# Patient Record
Sex: MALE | Race: WHITE | NOT HISPANIC OR LATINO
[De-identification: names, ages, dates, MRNs, and addresses within clinical notes are randomized per-mention and may not be internally consistent; named-entity substitution may affect disease eponyms.]

---

## 2017-05-03 ENCOUNTER — APPOINTMENT (OUTPATIENT)
Dept: DERMATOLOGY | Facility: CLINIC | Age: 57
End: 2017-05-03

## 2017-06-05 ENCOUNTER — APPOINTMENT (OUTPATIENT)
Dept: DERMATOLOGY | Facility: CLINIC | Age: 57
End: 2017-06-05

## 2018-04-24 ENCOUNTER — EMERGENCY (EMERGENCY)
Facility: HOSPITAL | Age: 58
LOS: 1 days | Discharge: HOME | End: 2018-04-24
Attending: STUDENT IN AN ORGANIZED HEALTH CARE EDUCATION/TRAINING PROGRAM
Payer: COMMERCIAL

## 2018-04-24 VITALS
RESPIRATION RATE: 20 BRPM | HEIGHT: 71 IN | DIASTOLIC BLOOD PRESSURE: 94 MMHG | WEIGHT: 229.94 LBS | SYSTOLIC BLOOD PRESSURE: 168 MMHG | TEMPERATURE: 98 F | OXYGEN SATURATION: 98 % | HEART RATE: 100 BPM

## 2018-04-24 LAB
ALBUMIN SERPL ELPH-MCNC: 4.6 G/DL — SIGNIFICANT CHANGE UP (ref 3.3–5.2)
ALP SERPL-CCNC: 75 U/L — SIGNIFICANT CHANGE UP (ref 40–120)
ALT FLD-CCNC: 38 U/L — SIGNIFICANT CHANGE UP
ANION GAP SERPL CALC-SCNC: 13 MMOL/L — SIGNIFICANT CHANGE UP (ref 5–17)
AST SERPL-CCNC: 56 U/L — HIGH
BASOPHILS # BLD AUTO: 0 K/UL — SIGNIFICANT CHANGE UP (ref 0–0.2)
BASOPHILS NFR BLD AUTO: 0.5 % — SIGNIFICANT CHANGE UP (ref 0–2)
BILIRUB SERPL-MCNC: 0.4 MG/DL — SIGNIFICANT CHANGE UP (ref 0.4–2)
BUN SERPL-MCNC: 8 MG/DL — SIGNIFICANT CHANGE UP (ref 8–20)
CALCIUM SERPL-MCNC: 8.8 MG/DL — SIGNIFICANT CHANGE UP (ref 8.6–10.2)
CHLORIDE SERPL-SCNC: 94 MMOL/L — LOW (ref 98–107)
CO2 SERPL-SCNC: 29 MMOL/L — SIGNIFICANT CHANGE UP (ref 22–29)
CREAT SERPL-MCNC: 0.66 MG/DL — SIGNIFICANT CHANGE UP (ref 0.5–1.3)
EOSINOPHIL # BLD AUTO: 0.3 K/UL — SIGNIFICANT CHANGE UP (ref 0–0.5)
EOSINOPHIL NFR BLD AUTO: 4.4 % — SIGNIFICANT CHANGE UP (ref 0–5)
ETHANOL SERPL-MCNC: 266 MG/DL — SIGNIFICANT CHANGE UP
GLUCOSE SERPL-MCNC: 124 MG/DL — HIGH (ref 70–115)
HCT VFR BLD CALC: 44.9 % — SIGNIFICANT CHANGE UP (ref 42–52)
HGB BLD-MCNC: 15.1 G/DL — SIGNIFICANT CHANGE UP (ref 14–18)
LYMPHOCYTES # BLD AUTO: 1.4 K/UL — SIGNIFICANT CHANGE UP (ref 1–4.8)
LYMPHOCYTES # BLD AUTO: 22.4 % — SIGNIFICANT CHANGE UP (ref 20–55)
MCHC RBC-ENTMCNC: 31.4 PG — HIGH (ref 27–31)
MCHC RBC-ENTMCNC: 33.6 G/DL — SIGNIFICANT CHANGE UP (ref 32–36)
MCV RBC AUTO: 93.3 FL — SIGNIFICANT CHANGE UP (ref 80–94)
MONOCYTES # BLD AUTO: 0.9 K/UL — HIGH (ref 0–0.8)
MONOCYTES NFR BLD AUTO: 14 % — HIGH (ref 3–10)
NEUTROPHILS # BLD AUTO: 3.7 K/UL — SIGNIFICANT CHANGE UP (ref 1.8–8)
NEUTROPHILS NFR BLD AUTO: 58.2 % — SIGNIFICANT CHANGE UP (ref 37–73)
PLATELET # BLD AUTO: 235 K/UL — SIGNIFICANT CHANGE UP (ref 150–400)
POTASSIUM SERPL-MCNC: 3.9 MMOL/L — SIGNIFICANT CHANGE UP (ref 3.5–5.3)
POTASSIUM SERPL-SCNC: 3.9 MMOL/L — SIGNIFICANT CHANGE UP (ref 3.5–5.3)
PROT SERPL-MCNC: 7.7 G/DL — SIGNIFICANT CHANGE UP (ref 6.6–8.7)
RBC # BLD: 4.81 M/UL — SIGNIFICANT CHANGE UP (ref 4.6–6.2)
RBC # FLD: 13.7 % — SIGNIFICANT CHANGE UP (ref 11–15.6)
SODIUM SERPL-SCNC: 136 MMOL/L — SIGNIFICANT CHANGE UP (ref 135–145)
WBC # BLD: 6.4 K/UL — SIGNIFICANT CHANGE UP (ref 4.8–10.8)
WBC # FLD AUTO: 6.4 K/UL — SIGNIFICANT CHANGE UP (ref 4.8–10.8)

## 2018-04-24 PROCEDURE — 72125 CT NECK SPINE W/O DYE: CPT | Mod: 26

## 2018-04-24 PROCEDURE — 70450 CT HEAD/BRAIN W/O DYE: CPT | Mod: 26

## 2018-04-24 PROCEDURE — 70486 CT MAXILLOFACIAL W/O DYE: CPT | Mod: 26

## 2018-04-24 PROCEDURE — 12013 RPR F/E/E/N/L/M 2.6-5.0 CM: CPT

## 2018-04-24 PROCEDURE — 99284 EMERGENCY DEPT VISIT MOD MDM: CPT | Mod: 25

## 2018-04-24 RX ORDER — TETANUS TOXOID, REDUCED DIPHTHERIA TOXOID AND ACELLULAR PERTUSSIS VACCINE, ADSORBED 5; 2.5; 8; 8; 2.5 [IU]/.5ML; [IU]/.5ML; UG/.5ML; UG/.5ML; UG/.5ML
0.5 SUSPENSION INTRAMUSCULAR ONCE
Qty: 0 | Refills: 0 | Status: COMPLETED | OUTPATIENT
Start: 2018-04-24 | End: 2018-04-24

## 2018-04-24 RX ADMIN — TETANUS TOXOID, REDUCED DIPHTHERIA TOXOID AND ACELLULAR PERTUSSIS VACCINE, ADSORBED 0.5 MILLILITER(S): 5; 2.5; 8; 8; 2.5 SUSPENSION INTRAMUSCULAR at 23:16
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
## 2018-04-24 NOTE — ED PROVIDER NOTE - MEDICAL DECISION MAKING DETAILS
Will evaluate for significant traumatic injury and possibly needs plastics to manage facial laceration.
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
## 2018-04-25 VITALS
OXYGEN SATURATION: 98 % | DIASTOLIC BLOOD PRESSURE: 81 MMHG | HEART RATE: 89 BPM | RESPIRATION RATE: 18 BRPM | TEMPERATURE: 98 F | SYSTOLIC BLOOD PRESSURE: 132 MMHG

## 2018-04-25 LAB — ETHANOL SERPL-MCNC: 102 MG/DL — SIGNIFICANT CHANGE UP

## 2018-04-25 PROCEDURE — 90715 TDAP VACCINE 7 YRS/> IM: CPT

## 2018-04-25 PROCEDURE — 70450 CT HEAD/BRAIN W/O DYE: CPT

## 2018-04-25 PROCEDURE — 90471 IMMUNIZATION ADMIN: CPT

## 2018-04-25 PROCEDURE — 70486 CT MAXILLOFACIAL W/O DYE: CPT

## 2018-04-25 PROCEDURE — 85027 COMPLETE CBC AUTOMATED: CPT

## 2018-04-25 PROCEDURE — 80053 COMPREHEN METABOLIC PANEL: CPT

## 2018-04-25 PROCEDURE — 12013 RPR F/E/E/N/L/M 2.6-5.0 CM: CPT

## 2018-04-25 PROCEDURE — 72125 CT NECK SPINE W/O DYE: CPT

## 2018-04-25 PROCEDURE — 99285 EMERGENCY DEPT VISIT HI MDM: CPT | Mod: 25

## 2018-04-25 PROCEDURE — 96374 THER/PROPH/DIAG INJ IV PUSH: CPT | Mod: XU

## 2018-04-25 PROCEDURE — 80307 DRUG TEST PRSMV CHEM ANLYZR: CPT

## 2018-04-25 PROCEDURE — 36415 COLL VENOUS BLD VENIPUNCTURE: CPT

## 2018-04-25 RX ORDER — IBUPROFEN 200 MG
600 TABLET ORAL ONCE
Qty: 0 | Refills: 0 | Status: COMPLETED | OUTPATIENT
Start: 2018-04-25 | End: 2018-04-25

## 2018-04-25 RX ORDER — IBUPROFEN 200 MG
1 TABLET ORAL
Qty: 28 | Refills: 0
Start: 2018-04-25 | End: 2018-05-01

## 2018-04-25 RX ORDER — AMPICILLIN SODIUM AND SULBACTAM SODIUM 250; 125 MG/ML; MG/ML
3 INJECTION, POWDER, FOR SUSPENSION INTRAMUSCULAR; INTRAVENOUS ONCE
Qty: 0 | Refills: 0 | Status: COMPLETED | OUTPATIENT
Start: 2018-04-25 | End: 2018-04-25

## 2018-04-25 RX ADMIN — Medication 600 MILLIGRAM(S): at 05:21

## 2018-04-25 RX ADMIN — AMPICILLIN SODIUM AND SULBACTAM SODIUM 200 GRAM(S): 250; 125 INJECTION, POWDER, FOR SUSPENSION INTRAMUSCULAR; INTRAVENOUS at 04:18

## 2018-04-25 NOTE — ED ADULT NURSE REASSESSMENT NOTE - NS ED NURSE REASSESS COMMENT FT1
pt c/o headache, medicated as ordered, resp even and unlabored no distress noted, pt A&Ox4 laying in stretcher and appears comfortable pt has no other complaints, will continue to monitor

## 2018-04-25 NOTE — ED ADULT NURSE REASSESSMENT NOTE - NS ED NURSE REASSESS COMMENT FT1
Patient verbalized understanding of discharge instructions, need for followup with (PMD and/or specialist)without fail.  Patient also provided with signs and symptoms to return to the emergency department immediately if they present.  Patient A+Ox3, resps even and nonlabored, patient in no apparent distress upon discharge.

## 2020-02-28 NOTE — ED ADULT NURSE NOTE - CAS TRG GEN SKIN CONDITION
----- Message from CAN Sexton sent at 2/28/2020  8:19 AM CST -----  Please notify the patient of normal results.  Calcium score is zero which is very reassuring and confers low near term risk of serious cardiac event related to atherosclerosis. Recommend ongoing lifestyle modifications for cholesterol control at this time. Repeat labs in one year prior to CPE. Follow-up as planned.  
Informed of results and recommendations.    Jayme verbalized thanks  
Left message.  
Patient returns nurse call, unable to warm transfer.  Please call patient back at [phone]   
Warm

## 2021-06-10 ENCOUNTER — EMERGENCY (EMERGENCY)
Facility: HOSPITAL | Age: 61
LOS: 1 days | Discharge: HOME | End: 2021-06-10
Attending: STUDENT IN AN ORGANIZED HEALTH CARE EDUCATION/TRAINING PROGRAM
Payer: COMMERCIAL

## 2021-06-10 VITALS
RESPIRATION RATE: 18 BRPM | HEART RATE: 69 BPM | TEMPERATURE: 98 F | HEIGHT: 71 IN | DIASTOLIC BLOOD PRESSURE: 85 MMHG | OXYGEN SATURATION: 96 % | SYSTOLIC BLOOD PRESSURE: 132 MMHG

## 2021-06-10 LAB
ALBUMIN SERPL ELPH-MCNC: 4.3 G/DL — SIGNIFICANT CHANGE UP (ref 3.3–5.2)
ALP SERPL-CCNC: 94 U/L — SIGNIFICANT CHANGE UP (ref 40–120)
ALT FLD-CCNC: 148 U/L — HIGH
ANION GAP SERPL CALC-SCNC: 14 MMOL/L — SIGNIFICANT CHANGE UP (ref 5–17)
APTT BLD: 31.4 SEC — SIGNIFICANT CHANGE UP (ref 27.5–35.5)
AST SERPL-CCNC: 223 U/L — HIGH
BASOPHILS # BLD AUTO: 0.05 K/UL — SIGNIFICANT CHANGE UP (ref 0–0.2)
BASOPHILS NFR BLD AUTO: 0.9 % — SIGNIFICANT CHANGE UP (ref 0–2)
BILIRUB SERPL-MCNC: 0.6 MG/DL — SIGNIFICANT CHANGE UP (ref 0.4–2)
BLD GP AB SCN SERPL QL: SIGNIFICANT CHANGE UP
BUN SERPL-MCNC: 4.1 MG/DL — LOW (ref 8–20)
CALCIUM SERPL-MCNC: 9 MG/DL — SIGNIFICANT CHANGE UP (ref 8.6–10.2)
CHLORIDE SERPL-SCNC: 101 MMOL/L — SIGNIFICANT CHANGE UP (ref 98–107)
CO2 SERPL-SCNC: 26 MMOL/L — SIGNIFICANT CHANGE UP (ref 22–29)
CREAT SERPL-MCNC: 0.64 MG/DL — SIGNIFICANT CHANGE UP (ref 0.5–1.3)
EOSINOPHIL # BLD AUTO: 0.06 K/UL — SIGNIFICANT CHANGE UP (ref 0–0.5)
EOSINOPHIL NFR BLD AUTO: 1.1 % — SIGNIFICANT CHANGE UP (ref 0–6)
ETHANOL SERPL-MCNC: 376 MG/DL — HIGH (ref 0–9)
GLUCOSE SERPL-MCNC: 115 MG/DL — HIGH (ref 70–99)
HCT VFR BLD CALC: 44.8 % — SIGNIFICANT CHANGE UP (ref 39–50)
HGB BLD-MCNC: 14.8 G/DL — SIGNIFICANT CHANGE UP (ref 13–17)
IMM GRANULOCYTES NFR BLD AUTO: 0.2 % — SIGNIFICANT CHANGE UP (ref 0–1.5)
INR BLD: 1.02 RATIO — SIGNIFICANT CHANGE UP (ref 0.88–1.16)
LACTATE BLDV-MCNC: 2.5 MMOL/L — HIGH (ref 0.5–2)
LIDOCAIN IGE QN: 34 U/L — SIGNIFICANT CHANGE UP (ref 22–51)
LYMPHOCYTES # BLD AUTO: 2.38 K/UL — SIGNIFICANT CHANGE UP (ref 1–3.3)
LYMPHOCYTES # BLD AUTO: 43.6 % — SIGNIFICANT CHANGE UP (ref 13–44)
MCHC RBC-ENTMCNC: 31.8 PG — SIGNIFICANT CHANGE UP (ref 27–34)
MCHC RBC-ENTMCNC: 33 GM/DL — SIGNIFICANT CHANGE UP (ref 32–36)
MCV RBC AUTO: 96.3 FL — SIGNIFICANT CHANGE UP (ref 80–100)
MONOCYTES # BLD AUTO: 0.45 K/UL — SIGNIFICANT CHANGE UP (ref 0–0.9)
MONOCYTES NFR BLD AUTO: 8.2 % — SIGNIFICANT CHANGE UP (ref 2–14)
NEUTROPHILS # BLD AUTO: 2.51 K/UL — SIGNIFICANT CHANGE UP (ref 1.8–7.4)
NEUTROPHILS NFR BLD AUTO: 46 % — SIGNIFICANT CHANGE UP (ref 43–77)
PLATELET # BLD AUTO: 201 K/UL — SIGNIFICANT CHANGE UP (ref 150–400)
POTASSIUM SERPL-MCNC: 4 MMOL/L — SIGNIFICANT CHANGE UP (ref 3.5–5.3)
POTASSIUM SERPL-SCNC: 4 MMOL/L — SIGNIFICANT CHANGE UP (ref 3.5–5.3)
PROT SERPL-MCNC: 7.7 G/DL — SIGNIFICANT CHANGE UP (ref 6.6–8.7)
PROTHROM AB SERPL-ACNC: 11.8 SEC — SIGNIFICANT CHANGE UP (ref 10.6–13.6)
RBC # BLD: 4.65 M/UL — SIGNIFICANT CHANGE UP (ref 4.2–5.8)
RBC # FLD: 13.7 % — SIGNIFICANT CHANGE UP (ref 10.3–14.5)
SODIUM SERPL-SCNC: 141 MMOL/L — SIGNIFICANT CHANGE UP (ref 135–145)
WBC # BLD: 5.46 K/UL — SIGNIFICANT CHANGE UP (ref 3.8–10.5)
WBC # FLD AUTO: 5.46 K/UL — SIGNIFICANT CHANGE UP (ref 3.8–10.5)

## 2021-06-10 PROCEDURE — 99285 EMERGENCY DEPT VISIT HI MDM: CPT

## 2021-06-10 PROCEDURE — 70450 CT HEAD/BRAIN W/O DYE: CPT | Mod: 26,MA

## 2021-06-10 PROCEDURE — 70486 CT MAXILLOFACIAL W/O DYE: CPT | Mod: 26,MA

## 2021-06-10 PROCEDURE — 74177 CT ABD & PELVIS W/CONTRAST: CPT | Mod: 26,MA

## 2021-06-10 PROCEDURE — 71045 X-RAY EXAM CHEST 1 VIEW: CPT | Mod: 26

## 2021-06-10 PROCEDURE — 71260 CT THORAX DX C+: CPT | Mod: 26,MA

## 2021-06-10 PROCEDURE — 72125 CT NECK SPINE W/O DYE: CPT | Mod: 26,MA

## 2021-06-10 PROCEDURE — 99281 EMR DPT VST MAYX REQ PHY/QHP: CPT | Mod: GC

## 2021-06-10 RX ORDER — SODIUM CHLORIDE 9 MG/ML
1000 INJECTION INTRAMUSCULAR; INTRAVENOUS; SUBCUTANEOUS ONCE
Refills: 0 | Status: COMPLETED | OUTPATIENT
Start: 2021-06-10 | End: 2021-06-10

## 2021-06-10 RX ORDER — KETOROLAC TROMETHAMINE 30 MG/ML
15 SYRINGE (ML) INJECTION ONCE
Refills: 0 | Status: DISCONTINUED | OUTPATIENT
Start: 2021-06-10 | End: 2021-06-10

## 2021-06-10 RX ADMIN — SODIUM CHLORIDE 2000 MILLILITER(S): 9 INJECTION INTRAMUSCULAR; INTRAVENOUS; SUBCUTANEOUS at 18:12

## 2021-06-10 RX ADMIN — Medication 15 MILLIGRAM(S): at 23:35

## 2021-06-10 NOTE — H&P ADULT - ASSESSMENT
62 yo M presenting to ED with unknown mechanism of fall, presenting with dried blood across his face not actively bleeding. EtOH intoxication. FAST negative.    Plan:  Please follow up CT imaging  Follow up labs  Pain control  Monitor for withdrawal symptoms
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
## 2021-06-10 NOTE — ED ADULT NURSE REASSESSMENT NOTE - NS ED NURSE REASSESS COMMENT FT1
Pt is alert and oriented. Pt denies sob, chest pain, nausea, vomiting and dizziness. Pt states that he has generalized body pain. Pt resp are even and unlabored, skin color prashanth for race. pt educated on plan of care, pt able to successfully teach back plan of care to RN, RN will continue to reeducate pt during hospital stay.
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
## 2021-06-10 NOTE — ED ADULT NURSE REASSESSMENT NOTE - REASSESS COMMUNICATION
MD Holder aware pt states "as im sobering up I think I have a rib fracture, it hurts to breathe"/ED physician notified
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
## 2021-06-10 NOTE — ED PROVIDER NOTE - NSFOLLOWUPINSTRUCTIONS_ED_ALL_ED_FT
Alcohol Abuse    Alcohol intoxication occurs when the amount of alcohol that a person has consumed impairs his or her ability to mentally and physically function. Chronic alcohol consumption can also lead to a variety of health issues including neurological disease, stomach disease, heart disease, liver disease, etc. Do not drive after drinking alcohol. Drinking enough alcohol to end up in an Emergency Room suggests you may have an alcohol abuse problem. Seek help at a drug addiction center.    SEEK IMMEDIATE MEDICAL CARE IF YOU HAVE ANY OF THE FOLLOWING SYMPTOMS: seizures, vomiting blood, blood in your stool, lightheadedness/dizziness, or becoming shaky to tremulous when you stop drinking.    Closed Head Injury    A closed head injury is an injury to your head that may or may not involve a traumatic brain injury (TBI). Symptoms of TBI can be short or long lasting and include headache, dizziness, interference with memory or speech, fatigue, confusion, changes in sleep, mood changes, nausea, depression/anxiety, and dulling of senses. Make sure to obtain proper rest which includes getting plenty of sleep, avoiding excessive visual stimulation, and avoiding activities that may cause physical or mental stress. Avoid any situation where there is potential for another head injury, including sports.    SEEK IMMEDIATE MEDICAL CARE IF YOU HAVE ANY OF THE FOLLOWING SYMPTOMS: unusual drowsiness, vomiting, severe dizziness, seizures, lightheadedness, muscular weakness, different pupil sizes, visual changes, or clear or bloody discharge from your ears or nose.    -Please follow-up with your primary care doctor in the next 2 days.  Please call tomorrow for an appointment.  If you cannot follow-up with your primary care doctor please return to the ED for any urgent issues.  - You were given a copy of the tests performed today.  Please bring the results with you and review them with your primary care doctor.  - If you have any worsening of symptoms or any other concerns please return to the ED immediately.  - Please continue taking your home medications as directed.

## 2021-06-10 NOTE — ED PROVIDER NOTE - PROGRESS NOTE DETAILS
Everett Holder, Resident: Pt still clinically intoxicated, complaining of musculoskeletal right chest pain, reproducible on palpation. S1/S2 noted, lungs CTABL. Will monitor for sobriety. surgery called again for re-eval Pt clinically sober, surgery team called for re-eval/ tertiary exam PT seen and reassessed. Cleared by trauma . Informed of incidental findings. Patient symptomatically improved.   AAOX3, NAD, VSS.  Discussed test results w/ patient, given copy of results. Patient verbalized understanding of hospital course and outpatient plans, has decisional making capacity.  Will follow-up with Primary care doctor in the next 2 days; patient will call for an appointment. Will return to the ED if there is any worsening of symptoms.  Patient able to ambulate w/o difficulty, is tolerating PO intake

## 2021-06-10 NOTE — ED PROVIDER NOTE - CLINICAL SUMMARY MEDICAL DECISION MAKING FREE TEXT BOX
Pt is a 61 y.o. M hx of EtOH abuse presenting after fall while intoxicated, pt with abrasions. Labs, meds, imaging, ekg.

## 2021-06-10 NOTE — ED PROVIDER NOTE - CONSTITUTIONAL NEGATIVE STATEMENT, MLM
[de-identified] : TRIGGER POINT INJECTIONS\par \par Patient has demonstrated limited relief from NSAIDS, rest, exercises / PT, and after discussion of the risks and benefits, the patient elected to proceed with a trigger point injection into the \par LEFT AND RIGHT LEVATOR AND RHOMBOID X 2 \par \par  I confirmed no prior adverse reactions, no active infections, and no relevant allergies. \par The skin was prepped in the usual sterile manner. The site was injected with local anesthetic followed by local needling. \par The injection was completed without complication and a bandage was applied.\par  \par The patient tolerated the procedure well and was given post-injection instructions. Cold Tx x 48 hours, analgesics. prn \par Medications: 1.5cc of 1% xylocaine + 1.5cc.25% Bupivicaine + KENALOG 1MG per site
no fever and no chills.

## 2021-06-10 NOTE — ED PROVIDER NOTE - ATTENDING CONTRIBUTION TO CARE
I, Ricardo Mckeon, performed the initial face to face bedside interview with this patient regarding history of present illness, review of symptoms and relevant past medical, social and family history.  I completed an independent physical examination.  I was the initial provider who evaluated this patient. I have signed out the follow up of any pending tests (i.e. labs, radiological studies) to the resident.  I have communicated the patient’s plan of care and disposition with the resident.

## 2021-06-10 NOTE — H&P ADULT - NSHPLABSRESULTS_GEN_ALL_CORE
Labs and Trauma scan pending    Update:  < from: CT Abdomen and Pelvis w/ IV Cont (06.10.21 @ 18:53) >     EXAM:  CT ABDOMEN AND PELVIS IC                         EXAM:  CT CHEST IC                          PROCEDURE DATE:  06/10/2021          INTERPRETATION:  CLINICAL INFORMATION: Trauma.    COMPARISON: None.    CONTRAST/COMPLICATIONS:  IV Contrast: Omnipaque 300  95 cc administered   5 cc discarded  Oral Contrast: NONE  Complications: None reported at time of study completion    PROCEDURE:  CT of the Chest, Abdomen and Pelvis was performed.  Sagittal and coronal reformats were performed.    FINDINGS:  CHEST:  LUNGS AND LARGE AIRWAYS: Patent central airways. No pulmonary nodules.  PLEURA: No pleural effusion.  VESSELS: Within normal limits.  HEART: Heart size is normal. No pericardial effusion.  MEDIASTINUM AND JUSTO: No lymphadenopathy.  CHESTWALL AND LOWER NECK: Bilateral gynecomastia.    ABDOMEN AND PELVIS:  LIVER: Enlarged, fatty liver.  BILE DUCTS: Normal caliber.  GALLBLADDER: Within normal limits.  SPLEEN: Within normal limits.  PANCREAS: Within normal limits.  ADRENALS: Within normal limits.  KIDNEYS/URETERS: Within normal limits.    BLADDER: Within normal limits.  REPRODUCTIVE ORGANS: Prostate within normal limits.    BOWEL: No bowel obstruction. Appendix is normal. Diverticulosis, without acute diverticulitis.  PERITONEUM: Noascites.  VESSELS: Within normal limits.  RETROPERITONEUM/LYMPH NODES: No lymphadenopathy.  ABDOMINAL WALL: Fat-containing left inguinal hernia.  BONES: Osteopenia. Old left-sided rib fractures. Healing fracture of the right anterior seventh rib.    IMPRESSION:  *  No evidence of acute abnormality in the chest, abdomen, or pelvis.  *  Enlarged, fatty liver.  *  Healing fracture of the right anterior seventh rib.              AMA MAR MD; Attending Radiologist    < from: CT Maxillofacial No Cont (06.10.21 @ 18:52) >    EXAM:  CT MAXILLOFACIAL                          PROCEDURE DATE:  06/10/2021          INTERPRETATION:  CT facial bones without IV contrast    CLINICAL INFORMATION:  Trauma    TECHNIQUE:  Contiguous axial sections were reconstructed through the facial bones using a single helical acquisition. 3-D reformats were obtained. Imaging post processing software was employed to generate reformatted images in multiple imaging planes.  This scan was performed using automatic exposure control (radiation dose reduction software) to obtain a diagnostic image quality scan with patient dose as low as reasonably achievable.    FINDINGS:   No prior similar studies are available for review.    Facial bones are intact without fracture. Very small right lateralscalp hematoma.    Several tiny retention cysts versus polyps in the inferior maxillary sinuses bilaterally.    The orbits are unremarkable.  Preseptal structures are preserved.  The globes are intact.  Intraconal and extraconal fat is preserved.  The extraocular muscles remain symmetric.  The superior ophthalmic veins are also symmetric.  Orbital rims remain intact.    The deep facial spaces are intact.   No radiopaque foreign body is seen.  The nasopharynx is symmetric.  The central skull base is intact.  The visualized intracranial contents appear unremarkable.      IMPRESSION:  Facial bones intact without fracture.    < end of copied text >

## 2021-06-10 NOTE — H&P ADULT - NSHPPHYSICALEXAM_GEN_ALL_CORE
Constitutional: Well-developed well nourished Male in no acute distress  HEENT: Head is normocephalic and atraumatic, maxillofacial structures stable, dried blood across face, no roy sign / racoon eyes, EOMI b/l, pupils [3]mm round and reactive to light b/l, no active drainage or redness  Neck: cervical collar in place, trachea midline  Respiratory: Breath sounds CTA b/l respirations are unlabored, no accessory muscle use, no conversational dyspnea  Cardiovascular: Regular rate & rhythm, +S1, S1, Chest wall is non-tender to palpation, no subQ emphysema or crepitus palpated  Gastrointestinal: Abdomen soft, non-tender, non-distended, no rebound tenderness / guarding, no ecchymosis or external signs of abdominal trauma  Musculoskeletal: moving all extremities spontaneously, no point tenderness or deformity noted to upper or lower extremities b/l  Pelvis: stable  Vascular: 2+ radial, femoral, and DP pulses b/l  Neurological: GCS: 15 (4/5/6). A&O x 3; no gross sensory / motor / coordination deficits  Musculoskeletal: 5/5 strength of upper and lower extremities b/l  Neuropsinal: no C/T/LS spine tenderness to palpation, no step-offs or signs of external trauma to the back

## 2021-06-10 NOTE — ED PROVIDER NOTE - ENMT, MLM
Airway patent, Nasal mucosa clear. Mouth with normal mucosa. Throat has no vesicles, no oropharyngeal exudates and uvula is midline. Forehead abrasion with hematoma palpable

## 2021-06-10 NOTE — ED PROVIDER NOTE - PATIENT PORTAL LINK FT
You can access the FollowMyHealth Patient Portal offered by [facility] by registering at the following website: [email or web address]. By joining [facility]’s FollowMyHealth portal, you will also be able to view your health information using other applications (apps) compatible with our system.

## 2021-06-11 ENCOUNTER — INPATIENT (INPATIENT)
Facility: HOSPITAL | Age: 61
LOS: 2 days | Discharge: HOME | DRG: 87 | End: 2021-06-14
Attending: SURGERY | Admitting: SURGERY
Payer: COMMERCIAL

## 2021-06-11 VITALS
HEIGHT: 71 IN | SYSTOLIC BLOOD PRESSURE: 145 MMHG | HEART RATE: 81 BPM | OXYGEN SATURATION: 98 % | TEMPERATURE: 98 F | RESPIRATION RATE: 18 BRPM | DIASTOLIC BLOOD PRESSURE: 91 MMHG

## 2021-06-11 VITALS
TEMPERATURE: 98 F | SYSTOLIC BLOOD PRESSURE: 180 MMHG | OXYGEN SATURATION: 98 % | RESPIRATION RATE: 19 BRPM | HEART RATE: 78 BPM | DIASTOLIC BLOOD PRESSURE: 92 MMHG

## 2021-06-11 DIAGNOSIS — S06.5X9A TRAUMATIC SUBDURAL HEMORRHAGE WITH LOSS OF CONSCIOUSNESS OF UNSPECIFIED DURATION, INITIAL ENCOUNTER: ICD-10-CM

## 2021-06-11 PROBLEM — I10 ESSENTIAL (PRIMARY) HYPERTENSION: Chronic | Status: ACTIVE | Noted: 2018-04-25

## 2021-06-11 LAB
ALBUMIN SERPL ELPH-MCNC: 4.4 G/DL — SIGNIFICANT CHANGE UP (ref 3.3–5.2)
ALP SERPL-CCNC: 106 U/L — SIGNIFICANT CHANGE UP (ref 40–120)
ALT FLD-CCNC: 174 U/L — HIGH
AMPHET UR-MCNC: NEGATIVE — SIGNIFICANT CHANGE UP
ANION GAP SERPL CALC-SCNC: 15 MMOL/L — SIGNIFICANT CHANGE UP (ref 5–17)
APPEARANCE UR: CLEAR — SIGNIFICANT CHANGE UP
APTT BLD: 31.6 SEC — SIGNIFICANT CHANGE UP (ref 27.5–35.5)
AST SERPL-CCNC: 267 U/L — HIGH
BARBITURATES UR SCN-MCNC: NEGATIVE — SIGNIFICANT CHANGE UP
BASOPHILS # BLD AUTO: 0.04 K/UL — SIGNIFICANT CHANGE UP (ref 0–0.2)
BASOPHILS NFR BLD AUTO: 0.7 % — SIGNIFICANT CHANGE UP (ref 0–2)
BENZODIAZ UR-MCNC: NEGATIVE — SIGNIFICANT CHANGE UP
BILIRUB SERPL-MCNC: 1 MG/DL — SIGNIFICANT CHANGE UP (ref 0.4–2)
BILIRUB UR-MCNC: NEGATIVE — SIGNIFICANT CHANGE UP
BLD GP AB SCN SERPL QL: SIGNIFICANT CHANGE UP
BUN SERPL-MCNC: 4.9 MG/DL — LOW (ref 8–20)
CALCIUM SERPL-MCNC: 8.9 MG/DL — SIGNIFICANT CHANGE UP (ref 8.6–10.2)
CHLORIDE SERPL-SCNC: 100 MMOL/L — SIGNIFICANT CHANGE UP (ref 98–107)
CO2 SERPL-SCNC: 25 MMOL/L — SIGNIFICANT CHANGE UP (ref 22–29)
COCAINE METAB.OTHER UR-MCNC: NEGATIVE — SIGNIFICANT CHANGE UP
COLOR SPEC: YELLOW — SIGNIFICANT CHANGE UP
CREAT SERPL-MCNC: 0.59 MG/DL — SIGNIFICANT CHANGE UP (ref 0.5–1.3)
DIFF PNL FLD: NEGATIVE — SIGNIFICANT CHANGE UP
EOSINOPHIL # BLD AUTO: 0.05 K/UL — SIGNIFICANT CHANGE UP (ref 0–0.5)
EOSINOPHIL NFR BLD AUTO: 0.8 % — SIGNIFICANT CHANGE UP (ref 0–6)
ETHANOL SERPL-MCNC: 334 MG/DL — HIGH (ref 0–9)
GLUCOSE BLDC GLUCOMTR-MCNC: 103 MG/DL — HIGH (ref 70–99)
GLUCOSE SERPL-MCNC: 109 MG/DL — HIGH (ref 70–99)
GLUCOSE UR QL: NEGATIVE MG/DL — SIGNIFICANT CHANGE UP
HCT VFR BLD CALC: 43.6 % — SIGNIFICANT CHANGE UP (ref 39–50)
HGB BLD-MCNC: 14.3 G/DL — SIGNIFICANT CHANGE UP (ref 13–17)
IMM GRANULOCYTES NFR BLD AUTO: 0.2 % — SIGNIFICANT CHANGE UP (ref 0–1.5)
INR BLD: 1.01 RATIO — SIGNIFICANT CHANGE UP (ref 0.88–1.16)
KETONES UR-MCNC: NEGATIVE — SIGNIFICANT CHANGE UP
LEUKOCYTE ESTERASE UR-ACNC: NEGATIVE — SIGNIFICANT CHANGE UP
LIDOCAIN IGE QN: 35 U/L — SIGNIFICANT CHANGE UP (ref 22–51)
LYMPHOCYTES # BLD AUTO: 1.87 K/UL — SIGNIFICANT CHANGE UP (ref 1–3.3)
LYMPHOCYTES # BLD AUTO: 30.5 % — SIGNIFICANT CHANGE UP (ref 13–44)
MCHC RBC-ENTMCNC: 31.2 PG — SIGNIFICANT CHANGE UP (ref 27–34)
MCHC RBC-ENTMCNC: 32.8 GM/DL — SIGNIFICANT CHANGE UP (ref 32–36)
MCV RBC AUTO: 95.2 FL — SIGNIFICANT CHANGE UP (ref 80–100)
METHADONE UR-MCNC: NEGATIVE — SIGNIFICANT CHANGE UP
MONOCYTES # BLD AUTO: 0.61 K/UL — SIGNIFICANT CHANGE UP (ref 0–0.9)
MONOCYTES NFR BLD AUTO: 10 % — SIGNIFICANT CHANGE UP (ref 2–14)
NEUTROPHILS # BLD AUTO: 3.55 K/UL — SIGNIFICANT CHANGE UP (ref 1.8–7.4)
NEUTROPHILS NFR BLD AUTO: 57.8 % — SIGNIFICANT CHANGE UP (ref 43–77)
NITRITE UR-MCNC: NEGATIVE — SIGNIFICANT CHANGE UP
OPIATES UR-MCNC: NEGATIVE — SIGNIFICANT CHANGE UP
PCP SPEC-MCNC: SIGNIFICANT CHANGE UP
PCP UR-MCNC: NEGATIVE — SIGNIFICANT CHANGE UP
PH UR: 6.5 — SIGNIFICANT CHANGE UP (ref 5–8)
PLATELET # BLD AUTO: 179 K/UL — SIGNIFICANT CHANGE UP (ref 150–400)
POTASSIUM SERPL-MCNC: 3.4 MMOL/L — LOW (ref 3.5–5.3)
POTASSIUM SERPL-SCNC: 3.4 MMOL/L — LOW (ref 3.5–5.3)
PROT SERPL-MCNC: 7.8 G/DL — SIGNIFICANT CHANGE UP (ref 6.6–8.7)
PROT UR-MCNC: NEGATIVE MG/DL — SIGNIFICANT CHANGE UP
PROTHROM AB SERPL-ACNC: 11.7 SEC — SIGNIFICANT CHANGE UP (ref 10.6–13.6)
RBC # BLD: 4.58 M/UL — SIGNIFICANT CHANGE UP (ref 4.2–5.8)
RBC # FLD: 13.4 % — SIGNIFICANT CHANGE UP (ref 10.3–14.5)
SARS-COV-2 RNA SPEC QL NAA+PROBE: SIGNIFICANT CHANGE UP
SODIUM SERPL-SCNC: 140 MMOL/L — SIGNIFICANT CHANGE UP (ref 135–145)
SP GR SPEC: 1 — LOW (ref 1.01–1.02)
THC UR QL: NEGATIVE — SIGNIFICANT CHANGE UP
UROBILINOGEN FLD QL: NEGATIVE MG/DL — SIGNIFICANT CHANGE UP
WBC # BLD: 6.13 K/UL — SIGNIFICANT CHANGE UP (ref 3.8–10.5)
WBC # FLD AUTO: 6.13 K/UL — SIGNIFICANT CHANGE UP (ref 3.8–10.5)

## 2021-06-11 PROCEDURE — 86900 BLOOD TYPING SEROLOGIC ABO: CPT

## 2021-06-11 PROCEDURE — 71045 X-RAY EXAM CHEST 1 VIEW: CPT

## 2021-06-11 PROCEDURE — 85730 THROMBOPLASTIN TIME PARTIAL: CPT

## 2021-06-11 PROCEDURE — 80307 DRUG TEST PRSMV CHEM ANLYZR: CPT

## 2021-06-11 PROCEDURE — 85610 PROTHROMBIN TIME: CPT

## 2021-06-11 PROCEDURE — 86850 RBC ANTIBODY SCREEN: CPT

## 2021-06-11 PROCEDURE — 70450 CT HEAD/BRAIN W/O DYE: CPT | Mod: 26

## 2021-06-11 PROCEDURE — 72170 X-RAY EXAM OF PELVIS: CPT | Mod: 26

## 2021-06-11 PROCEDURE — 70486 CT MAXILLOFACIAL W/O DYE: CPT | Mod: 26,MA

## 2021-06-11 PROCEDURE — 99253 IP/OBS CNSLTJ NEW/EST LOW 45: CPT

## 2021-06-11 PROCEDURE — 70450 CT HEAD/BRAIN W/O DYE: CPT

## 2021-06-11 PROCEDURE — 74177 CT ABD & PELVIS W/CONTRAST: CPT

## 2021-06-11 PROCEDURE — 71045 X-RAY EXAM CHEST 1 VIEW: CPT | Mod: 26

## 2021-06-11 PROCEDURE — 99222 1ST HOSP IP/OBS MODERATE 55: CPT

## 2021-06-11 PROCEDURE — 72125 CT NECK SPINE W/O DYE: CPT

## 2021-06-11 PROCEDURE — 70486 CT MAXILLOFACIAL W/O DYE: CPT

## 2021-06-11 PROCEDURE — 86901 BLOOD TYPING SEROLOGIC RH(D): CPT

## 2021-06-11 PROCEDURE — 99285 EMERGENCY DEPT VISIT HI MDM: CPT | Mod: 25

## 2021-06-11 PROCEDURE — 36415 COLL VENOUS BLD VENIPUNCTURE: CPT

## 2021-06-11 PROCEDURE — 96374 THER/PROPH/DIAG INJ IV PUSH: CPT | Mod: XU

## 2021-06-11 PROCEDURE — 83690 ASSAY OF LIPASE: CPT

## 2021-06-11 PROCEDURE — 83605 ASSAY OF LACTIC ACID: CPT

## 2021-06-11 PROCEDURE — 99291 CRITICAL CARE FIRST HOUR: CPT

## 2021-06-11 PROCEDURE — 85025 COMPLETE CBC W/AUTO DIFF WBC: CPT

## 2021-06-11 PROCEDURE — 71260 CT THORAX DX C+: CPT

## 2021-06-11 PROCEDURE — 72125 CT NECK SPINE W/O DYE: CPT | Mod: 26

## 2021-06-11 PROCEDURE — 93010 ELECTROCARDIOGRAM REPORT: CPT

## 2021-06-11 PROCEDURE — 80053 COMPREHEN METABOLIC PANEL: CPT

## 2021-06-11 RX ORDER — TRAMADOL HYDROCHLORIDE 50 MG/1
50 TABLET ORAL EVERY 6 HOURS
Refills: 0 | Status: DISCONTINUED | OUTPATIENT
Start: 2021-06-11 | End: 2021-06-12

## 2021-06-11 RX ORDER — SODIUM CHLORIDE 9 MG/ML
1000 INJECTION, SOLUTION INTRAVENOUS
Refills: 0 | Status: DISCONTINUED | OUTPATIENT
Start: 2021-06-11 | End: 2021-06-12

## 2021-06-11 RX ORDER — SENNA PLUS 8.6 MG/1
2 TABLET ORAL AT BEDTIME
Refills: 0 | Status: DISCONTINUED | OUTPATIENT
Start: 2021-06-11 | End: 2021-06-14

## 2021-06-11 RX ORDER — SODIUM CHLORIDE 9 MG/ML
1000 INJECTION, SOLUTION INTRAVENOUS
Refills: 0 | Status: DISCONTINUED | OUTPATIENT
Start: 2021-06-11 | End: 2021-06-11

## 2021-06-11 RX ORDER — CHLORHEXIDINE GLUCONATE 213 G/1000ML
1 SOLUTION TOPICAL
Refills: 0 | Status: DISCONTINUED | OUTPATIENT
Start: 2021-06-11 | End: 2021-06-12

## 2021-06-11 RX ORDER — DEXMEDETOMIDINE HYDROCHLORIDE IN 0.9% SODIUM CHLORIDE 4 UG/ML
0.1 INJECTION INTRAVENOUS
Qty: 200 | Refills: 0 | Status: DISCONTINUED | OUTPATIENT
Start: 2021-06-11 | End: 2021-06-12

## 2021-06-11 RX ORDER — POTASSIUM CHLORIDE 20 MEQ
10 PACKET (EA) ORAL
Refills: 0 | Status: COMPLETED | OUTPATIENT
Start: 2021-06-11 | End: 2021-06-11

## 2021-06-11 RX ORDER — TRAMADOL HYDROCHLORIDE 50 MG/1
25 TABLET ORAL EVERY 6 HOURS
Refills: 0 | Status: DISCONTINUED | OUTPATIENT
Start: 2021-06-11 | End: 2021-06-12

## 2021-06-11 RX ORDER — ACETAMINOPHEN 500 MG
975 TABLET ORAL EVERY 6 HOURS
Refills: 0 | Status: DISCONTINUED | OUTPATIENT
Start: 2021-06-11 | End: 2021-06-12

## 2021-06-11 RX ORDER — ONDANSETRON 8 MG/1
4 TABLET, FILM COATED ORAL EVERY 6 HOURS
Refills: 0 | Status: DISCONTINUED | OUTPATIENT
Start: 2021-06-11 | End: 2021-06-12

## 2021-06-11 RX ORDER — HYDRALAZINE HCL 50 MG
10 TABLET ORAL ONCE
Refills: 0 | Status: COMPLETED | OUTPATIENT
Start: 2021-06-11 | End: 2021-06-11

## 2021-06-11 RX ORDER — POTASSIUM CHLORIDE 20 MEQ
40 PACKET (EA) ORAL ONCE
Refills: 0 | Status: COMPLETED | OUTPATIENT
Start: 2021-06-11 | End: 2021-06-11

## 2021-06-11 RX ADMIN — Medication 100 MILLIGRAM(S): at 07:27

## 2021-06-11 RX ADMIN — Medication 100 MILLIEQUIVALENT(S): at 23:55

## 2021-06-11 RX ADMIN — Medication 100 MILLIEQUIVALENT(S): at 22:02

## 2021-06-11 RX ADMIN — Medication 40 MILLIEQUIVALENT(S): at 21:15

## 2021-06-11 RX ADMIN — SODIUM CHLORIDE 150 MILLILITER(S): 9 INJECTION, SOLUTION INTRAVENOUS at 22:02

## 2021-06-11 RX ADMIN — TRAMADOL HYDROCHLORIDE 50 MILLIGRAM(S): 50 TABLET ORAL at 23:00

## 2021-06-11 RX ADMIN — Medication 15 MILLIGRAM(S): at 00:05

## 2021-06-11 RX ADMIN — Medication 10 MILLIGRAM(S): at 21:51

## 2021-06-11 RX ADMIN — Medication 975 MILLIGRAM(S): at 23:59

## 2021-06-11 RX ADMIN — TRAMADOL HYDROCHLORIDE 50 MILLIGRAM(S): 50 TABLET ORAL at 22:29

## 2021-06-11 RX ADMIN — Medication 100 MILLIEQUIVALENT(S): at 22:57

## 2021-06-11 NOTE — PATIENT PROFILE ADULT - NSPRONUTRITIONRISK_GEN_A_NUR
My name is Staff, I am contacting you from [facility] pain management regarding your appointment scheduled for 08.14.19, with Provider Dr. Mcclendon, just confirming you will be able to make it.    If you feel you need to reschedule or canceled please give our office a call so we can better assist you.      Staff requesting patient to arrive 15 mins ahead of schedule appointment time.    A voicemail was left to remind patient of their appt  
No indicators present

## 2021-06-11 NOTE — ED ADULT NURSE NOTE - ED CARDIAC RATE
Spoke with patient about her referral. We referred her 6 months back. The patient has already established care and has been seeing Dr. Angulo. She did not contact his clinic first for an appt. I explained that since she is established and has been referred within a year she shouldn't need a new referral. I further advised that if she has any issues and if they do request a new referral to just let us know. The patient verbalized understanding and did not have any further questions.    normal
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
## 2021-06-11 NOTE — H&P ADULT - ASSESSMENT
A/P: 60 y/o M w/known EtOH use s/p fall yesterday and today, found to have Left-sided SDH and SAH, in addition to right nasal bone fx. No other apparent injuries on exam, otherwise stable.    -Admit to SICU  -Neurosurgery eval  -[facility] protocol  -Serial neuro exams  -Drug and alcohol screen

## 2021-06-11 NOTE — CONSULT NOTE ADULT - ASSESSMENT
61 year old male PMHx of HTN and ETOH abuse presents as a trauma B s/p fall. Patient does not recall having a fall today but does admit to a fall yesterday and was subsequently seen here and discharged without any acute findings. CTH obtained with findings of a LEFT SDH and small SAH.    Neuro:  	Q1 hour Neuro checks, Q1 hour Vitals  	HOB 30 degrees, Neck midline position  	Neurosurgical Imaging Reviewed, recommend a repeat CTH in 6 hours   	Keppra x7 days 500 BID  	Avoid Sedation  	SBP Goal<140  	[employer] DVT prophylaxis: SCDs only   	Further management per SICU/Trauma
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
## 2021-06-11 NOTE — ED PROVIDER NOTE - CARE PLAN
Principal Discharge DX:	Subdural hematoma  Secondary Diagnosis:	Nasal fracture  Secondary Diagnosis:	Subarachnoid hematoma

## 2021-06-11 NOTE — H&P ADULT - NSHPLABSRESULTS_GEN_ALL_CORE
6/11 CT Head/C-spine:   CT HEAD: There is new small left frontal convexity acute subdural hematoma. There is no significant associated mass effect or midline shift. There is associated trace left frontal subarachnoid hemorrhage. No depressed calvarial fracture.    CT CERVICAL SPINE: No fracture or acute traumatic malalignment.    CT maxillofacial: Mildly displaced right nasal bone fracture. Mild right periorbital and right facial soft tissue swelling. No retrobulbar hematoma. The bony orbits and globes are intact.

## 2021-06-11 NOTE — ED PROVIDER NOTE - ATTENDING CONTRIBUTION TO CARE
I personally saw the patient with the resident, and completed the key components of the history and physical exam. I then discussed the management plan with the resident.   gen gcs 15 resp clear cardiac no murmur abd soft neuro no laterazing deficits skin + right periorbital abrasions

## 2021-06-11 NOTE — H&P ADULT - ATTENDING COMMENTS
I have seen and examined the patient on arrival to trauma bay,   fall form standing, priority CT found SDH and trauma activated    On arrival  ABCD intact  GCS 16 intoxicated, LLAMAS no focal  HD normal  edema nose, and blood right side of blood.    CXR no PTX, NOP pelvic fx.    Images reviewed, acute SDH and nasl bone fx/. no c spins  negative conf exam    PLAN:  Traum SDH and nasal bone fx  admit to SICU,  for neuro exams  repeat CT in 4-6 hsr of if GCS down by 2  neurosurgery consult  elevated etoh, states he drinks daily, elevated risk for etoh withdrawal  CIWA with medications  SCD

## 2021-06-11 NOTE — CHART NOTE - NSCHARTNOTEFT_GEN_A_CORE
Tertiary Trauma Survey (TTS)    Date of TTS: 06-11-21 @ 06:19                             Admit Date: 06-10-21 @ 18:05      Trauma Activation: B      Subjective / 24 hour events:   Patient seen and examined for tertiary exam. Patient denied any new symptoms or complaints. Patient reports some mild tenderness to the left side of the chest which he attributes to his old rib fractures. Pain is well controlled with current pain regimen. Patient denies any nausea, vomiting, fevers, chills, lightheadedness.    Vital Signs Last 24 Hrs  T(C): 36.4 (11 Jun 2021 00:05), Max: 36.4 (11 Jun 2021 00:05)  T(F): 97.5 (11 Jun 2021 00:05), Max: 97.5 (11 Jun 2021 00:05)  HR: 81 (11 Jun 2021 00:05) (81 - 81)  BP: 148/81 (11 Jun 2021 00:05) (148/81 - 148/81)  BP(mean): --  RR: 18 (11 Jun 2021 00:05) (18 - 18)  SpO2: 97% (11 Jun 2021 00:05) (97% - 97%)    Physical Exam:    Neuro: [X] non focal neurological exam [ ] Focal Neurological deficits noted to be:     HEENT: [X] Normo-cephalic/atraumatic  [ ] abnormalities noted to be:    Pulm/Chest:  [X] CTA b/l  [ ] chest wall non tender  [ ] abnormalities noted to be:    Cardiac: [X] S1S2, sinus rhythm  [ ] abnormalities noted to be:     GI / Abdomen: [X] Soft, non-tender, non-distended [ ] abnormalities noted to be:    Musculoskeletal / Extremities: [X]normal active ROM  [ ]  abnormalities noted to be:    Integumentary: [X] Skin intact [ ] Warm [ ] Dry [ ]abnormalities noted to be:    Vascular: [X] 2+ palpable distal pulses  [ ] DEBBY:       [ ] abnormalities noted to be:      List Injuries Identified to Date:  - right frontal small hematom    List Operative and Interventional Radiological Procedures:     Consults (Date):  [  ] Neurosurgery   [  ] Orthopedics  [  ] Plastics  [  ] Urology  [  ] PM&R  [  ] Social Work    RADIOLOGICAL FINDINGS REVIEW:  < from: CT Cervical Spine No Cont (06.10.21 @ 18:52) >    FINDINGS:   No prior similar studies are available for review    Cervical vertebral body heights are maintained. No vertebral fracture is seen. No destructive bone lesion is found.  Alignment is preserved.  Facet joints appear intact and aligned.    Cervical intervertebral disc spaces show multilevel degenerative disease and spondylosis with loss of intervertebral disc height and associated degenerative endplate changes.  There is multilevel narrowing of the neural foramina on a degenerative basis.  There is no evidence of high-grade central canal stenosis.   MR would be required to evaluate the intervertebral discs at higher sensitivity for disc pathology.    The skull base appears intact.  No neck mass is recognized.  Paraspinal soft tissues appear intact. Visualized lymph nodes appear to be within physiologic size limits.      IMPRESSION:    No acute fracture.  Multilevel degenerative changes of the cervical spine as above.      < end of copied text >    < from: CT Chest w/ IV Cont (06.10.21 @ 18:52) >    FINDINGS:  CHEST:  LUNGS AND LARGE AIRWAYS: Patent central airways. No pulmonary nodules.  PLEURA: No pleural effusion.  VESSELS: Within normal limits.  HEART: Heart size is normal. No pericardial effusion.  MEDIASTINUM AND JUSTO: No lymphadenopathy.  CHESTWALL AND LOWER NECK: Bilateral gynecomastia.    ABDOMEN AND PELVIS:  LIVER: Enlarged, fatty liver.  BILE DUCTS: Normal caliber.  GALLBLADDER: Within normal limits.  SPLEEN: Within normal limits.  PANCREAS: Within normal limits.  ADRENALS: Within normal limits.  KIDNEYS/URETERS: Within normal limits.    BLADDER: Within normal limits.  REPRODUCTIVE ORGANS: Prostate within normal limits.    BOWEL: No bowel obstruction. Appendix is normal. Diverticulosis, without acute diverticulitis.  PERITONEUM: Noascites.  VESSELS: Within normal limits.  RETROPERITONEUM/LYMPH NODES: No lymphadenopathy.  ABDOMINAL WALL: Fat-containing left inguinal hernia.  BONES: Osteopenia. Old left-sided rib fractures. Healing fracture of the right anterior seventh rib.    IMPRESSION:  *  No evidence of acute abnormality in the chest, abdomen, or pelvis.  *  Enlarged, fatty liver.  *  Healing fracture of the right anterior seventh rib.      < end of copied text >    < from: CT Maxillofacial No Cont (06.10.21 @ 18:52) >      IMPRESSION:  Facial bones intact without fracture.    < end of copied text >    ASSESSMENT:  Patient is a 60 yo M s/p GLF and intoxicated who doesn't have any acute injuries on imaging.    PLAN:  - no other acute findings or symptoms identified on tertiary exam  - incidental form with other findings on CT scan provided to patient  - patient is cleared from a trauma surgery standpoint  - rest of dispo per ED

## 2021-06-11 NOTE — ED PROVIDER NOTE - OBJECTIVE STATEMENT
62yo M w/pmh EtOH abuse presents BIBEMS for syncope while intoxicated, witnessed fall per EMS. No complaints on arrival.

## 2021-06-11 NOTE — CONSULT NOTE ADULT - SUBJECTIVE AND OBJECTIVE BOX
HPI:   61 year old male PMHx of HTN and ETOH abuse presents as a trauma B s/p fall. Per EMS, reports patient sustained a fall outside. Patient does not recall having a fall today but does admit to a fall yesterday and was subsequently seen here and discharged without any acute findings. Patient is a poor historian secondary to ETOH use but denies any use of AP/AC. CTH obtained with findings of a LEFT SDH and small SAH. Patient denies any complaints at this time.       Past Medical History, Family History, Social History:  PAST MEDICAL & SURGICAL HISTORY:  HTN (hypertension)    No significant past surgical history    FAMILY HISTORY:  No pertinent family history in first degree relatives    Social History:  Notes EtOH use, drinks minimum 8 beers daily (11 Jun 2021 19:21)       Review of Systems:  Constitutional:  No fevers, chills, or new weakness  Eyes: No double vision, no change in visual acuity, no blurry vision, no occular discharge  Neurologic: No new weakness, no seizure reported  Ears, nose, mouth throat:  No otorrhea No change in hearing, No anosmia, No oral lesions, No sore throat  Cardiovascular: No palpitations, no chest pain,  Respiratory: No shortness of breath, No Cough  Gastrointestinal: No change in bowel habits, no change in appetite  Genitourinary: No Frequency, No Dysuria, no Hematuria  Musculoskeletal: No muscle wasting, No new weakness    All other systems reviewed and are negative      Physical Exam:  Constitutional: NAD    Neuro  * Mental Status:  GCS 15:  E(4), V(5), M(6).  Awake, alert, oriented to conversation.  * Cranial Nerves: Cnii-Cnxii grossly intact. PERRL, EOMI, tongue midline, no gaze deviation  * HEENT: RIGHT scalp hematoma, Right facial laceration with dried blood, cervical collar in place   * Motor: RUE 5/5, LUE 5/5, RLE 5/5, LLE 5/5  * Sensory: Sensation intact to light touch  * Gait: Not assessed    Cardiovascular:  Regular rate and rhythm.  Eyes: See neurologic examination with detailed examination of eyes.  Respiratory: Clear to auscultation.  Gastrointestinal: Soft, nontender, nondistended.  Genitourinary: [ ] Campbell, [ x ] No Campbell.   Musculoskeletal: No muscle wasting noted, (See neuorlogic assessment for full muscle strength assessment) No pretibial edema appreciated, no appreciable calf tenderness.      Vitals:  Vital Signs Last 24 Hrs  T(C): 36.7 (11 Jun 2021 18:02), Max: 36.8 (11 Jun 2021 06:21)  T(F): 98.1 (11 Jun 2021 18:02), Max: 98.3 (11 Jun 2021 06:21)  HR: 81 (11 Jun 2021 18:02) (78 - 81)  BP: 145/91 (11 Jun 2021 18:02) (145/91 - 180/92)  BP(mean): --  RR: 18 (11 Jun 2021 18:02) (18 - 19)  SpO2: 98% (11 Jun 2021 18:02) (97% - 98%)      Labs & Radiology:                        14.3   6.13  )-----------( 179      ( 11 Jun 2021 19:18 )             43.6       06-10    141  |  101  |  4.1<L>  ----------------------------<  115<H>  4.0   |  26.0  |  0.64    Ca    9.0      10 Florin 2021 18:22    TPro  7.7  /  Alb  4.3  /  TBili  0.6  /  DBili  x   /  AST  223<H>  /  ALT  148<H>  /  AlkPhos  94  06-10      LIVER FUNCTIONS - ( 10 Florin 2021 18:22 )  Alb: 4.3 g/dL / Pro: 7.7 g/dL / ALK PHOS: 94 U/L / ALT: 148 U/L / AST: 223 U/L / GGT: x             PT/INR - ( 10 Florin 2021 18:22 )   PT: 11.8 sec;   INR: 1.02 ratio         PTT - ( 10 Florin 2021 18:22 )  PTT:31.4 sec        Neurosurgery Imaging:  CT head:  There is a new left frontal convexity subdural, measuring 6.5 mm in greatest thickness. There is no significant mass effect or midline shift. There is associated left frontal trace subarachnoid hemorrhage.

## 2021-06-11 NOTE — H&P ADULT - NSHPPHYSICALEXAM_GEN_ALL_CORE
PHYSICAL EXAM:  General:A&Ox3, resting comfortably  HEENT: Large right facial hematoma in nasal region. No other acute injuries identified. EOMI, PERRLA  Neck: Soft, midline trachea, nontender to palpation  throughout spine. ROM intact  Chest: No chest wall tenderness.   Abdomen: Soft, non-distended, non-tender  Groin: Normal appearing, pelvis stable  Ext: Palpable radial & DP pulses bilaterally, nontender to palpation throughout all joints, strength 5/5 bilaterally in the upper/lower extremities. No abrasions.   Back: No TTP; no palpable runoff/stepoff/deformity

## 2021-06-11 NOTE — ED ADULT NURSE NOTE - NSFALLRSKOUTCOME_ED_ALL_ED
[facility]   [address] [address]    DISCHARGE SUMMARY     Patient: Yamile Valentin                             Medical Record Number: [id number]                : 1976  Age: 45 y.o. Admit Date: 2017  Discharge Date: 2017  Admission Diagnosis: HNP C4-5  Discharge Diagnosis: HNP C4-5  Procedures: Procedure(s):   ANTERIOR CERVICAL FUSION C4-5  Surgeon: AVIVA Delgadillo MD  Assistants: Clint Irving PA-C  Anesthesia: general  Complications: None     History of Present Illness:  Yamile Valentin seen with complaints of neck, shoulder, arm pain to the left side. She failed to improve with conservative measures. Ultimately MR scan of his neck completed showing an isolated pathology at C4-C5 with disc herniation and resulting in neural foraminal narrowing to the left side. Given the persistent pain and dysfunction despite those measures, an anterior decompression and fusion now offered. Potential benefits and complications reviewed. She consented to undergo after a discussion of the risks, benefits, alternatives, and potential complications to surgery. Hospital Course:  Yamile Valentin tolerated the procedure well. She was transferred to the Spinal Unit from the recovery room in stable condition. On postoperative day 1, the dressing was clean and dry, she was neurovascularly intact and afebrile, and her vital signs were stable. She was out of bed ambulating, tolerating a diet and voiding. Yamile Valentin made satisfactory progress and was discharged to Home in stable condition on postoperative day 1. She was given routine postoperative instructions and advised to follow up in my office in 2 weeks following discharge home. She was given prescriptions for pain medications. Discharge Medications:  Cannot display discharge medications since this patient is not currently admitted. Signed by:  Elizabeth Arceo  2017 Universal Safety Interventions

## 2021-06-12 LAB
ANION GAP SERPL CALC-SCNC: 14 MMOL/L — SIGNIFICANT CHANGE UP (ref 5–17)
APPEARANCE UR: CLEAR — SIGNIFICANT CHANGE UP
BASOPHILS # BLD AUTO: 0.05 K/UL — SIGNIFICANT CHANGE UP (ref 0–0.2)
BASOPHILS NFR BLD AUTO: 1.1 % — SIGNIFICANT CHANGE UP (ref 0–2)
BILIRUB UR-MCNC: NEGATIVE — SIGNIFICANT CHANGE UP
BUN SERPL-MCNC: 4 MG/DL — LOW (ref 8–20)
CALCIUM SERPL-MCNC: 8.4 MG/DL — LOW (ref 8.6–10.2)
CHLORIDE SERPL-SCNC: 107 MMOL/L — SIGNIFICANT CHANGE UP (ref 98–107)
CO2 SERPL-SCNC: 22 MMOL/L — SIGNIFICANT CHANGE UP (ref 22–29)
COLOR SPEC: YELLOW — SIGNIFICANT CHANGE UP
COVID-19 SPIKE DOMAIN AB INTERP: POSITIVE
COVID-19 SPIKE DOMAIN ANTIBODY RESULT: 21.5 U/ML — HIGH
CREAT SERPL-MCNC: 0.47 MG/DL — LOW (ref 0.5–1.3)
DIFF PNL FLD: NEGATIVE — SIGNIFICANT CHANGE UP
EOSINOPHIL # BLD AUTO: 0.08 K/UL — SIGNIFICANT CHANGE UP (ref 0–0.5)
EOSINOPHIL NFR BLD AUTO: 1.7 % — SIGNIFICANT CHANGE UP (ref 0–6)
GLUCOSE SERPL-MCNC: 88 MG/DL — SIGNIFICANT CHANGE UP (ref 70–99)
GLUCOSE UR QL: NEGATIVE MG/DL — SIGNIFICANT CHANGE UP
HCT VFR BLD CALC: 40.4 % — SIGNIFICANT CHANGE UP (ref 39–50)
HCV AB S/CO SERPL IA: 0.2 S/CO — SIGNIFICANT CHANGE UP (ref 0–0.99)
HCV AB SERPL-IMP: SIGNIFICANT CHANGE UP
HGB BLD-MCNC: 13.4 G/DL — SIGNIFICANT CHANGE UP (ref 13–17)
IMM GRANULOCYTES NFR BLD AUTO: 0.4 % — SIGNIFICANT CHANGE UP (ref 0–1.5)
KETONES UR-MCNC: ABNORMAL
LEUKOCYTE ESTERASE UR-ACNC: NEGATIVE — SIGNIFICANT CHANGE UP
LYMPHOCYTES # BLD AUTO: 1.35 K/UL — SIGNIFICANT CHANGE UP (ref 1–3.3)
LYMPHOCYTES # BLD AUTO: 28.7 % — SIGNIFICANT CHANGE UP (ref 13–44)
MAGNESIUM SERPL-MCNC: 1.8 MG/DL — SIGNIFICANT CHANGE UP (ref 1.6–2.6)
MCHC RBC-ENTMCNC: 31.9 PG — SIGNIFICANT CHANGE UP (ref 27–34)
MCHC RBC-ENTMCNC: 33.2 GM/DL — SIGNIFICANT CHANGE UP (ref 32–36)
MCV RBC AUTO: 96.2 FL — SIGNIFICANT CHANGE UP (ref 80–100)
MONOCYTES # BLD AUTO: 0.58 K/UL — SIGNIFICANT CHANGE UP (ref 0–0.9)
MONOCYTES NFR BLD AUTO: 12.3 % — SIGNIFICANT CHANGE UP (ref 2–14)
NEUTROPHILS # BLD AUTO: 2.62 K/UL — SIGNIFICANT CHANGE UP (ref 1.8–7.4)
NEUTROPHILS NFR BLD AUTO: 55.8 % — SIGNIFICANT CHANGE UP (ref 43–77)
NITRITE UR-MCNC: NEGATIVE — SIGNIFICANT CHANGE UP
PH UR: 6 — SIGNIFICANT CHANGE UP (ref 5–8)
PHOSPHATE SERPL-MCNC: 2.6 MG/DL — SIGNIFICANT CHANGE UP (ref 2.4–4.7)
PLATELET # BLD AUTO: 161 K/UL — SIGNIFICANT CHANGE UP (ref 150–400)
POTASSIUM SERPL-MCNC: 3.4 MMOL/L — LOW (ref 3.5–5.3)
POTASSIUM SERPL-SCNC: 3.4 MMOL/L — LOW (ref 3.5–5.3)
PROT UR-MCNC: NEGATIVE MG/DL — SIGNIFICANT CHANGE UP
RBC # BLD: 4.2 M/UL — SIGNIFICANT CHANGE UP (ref 4.2–5.8)
RBC # FLD: 13.7 % — SIGNIFICANT CHANGE UP (ref 10.3–14.5)
SARS-COV-2 IGG+IGM SERPL QL IA: 21.5 U/ML — HIGH
SARS-COV-2 IGG+IGM SERPL QL IA: POSITIVE
SODIUM SERPL-SCNC: 143 MMOL/L — SIGNIFICANT CHANGE UP (ref 135–145)
SP GR SPEC: 1.02 — SIGNIFICANT CHANGE UP (ref 1.01–1.02)
UROBILINOGEN FLD QL: 1 MG/DL
WBC # BLD: 4.7 K/UL — SIGNIFICANT CHANGE UP (ref 3.8–10.5)
WBC # FLD AUTO: 4.7 K/UL — SIGNIFICANT CHANGE UP (ref 3.8–10.5)

## 2021-06-12 PROCEDURE — 99232 SBSQ HOSP IP/OBS MODERATE 35: CPT

## 2021-06-12 PROCEDURE — 70450 CT HEAD/BRAIN W/O DYE: CPT | Mod: 26

## 2021-06-12 PROCEDURE — 99231 SBSQ HOSP IP/OBS SF/LOW 25: CPT

## 2021-06-12 PROCEDURE — 71045 X-RAY EXAM CHEST 1 VIEW: CPT | Mod: 26

## 2021-06-12 RX ORDER — LIDOCAINE 4 G/100G
1 CREAM TOPICAL DAILY
Refills: 0 | Status: DISCONTINUED | OUTPATIENT
Start: 2021-06-12 | End: 2021-06-14

## 2021-06-12 RX ORDER — POTASSIUM PHOSPHATE, MONOBASIC POTASSIUM PHOSPHATE, DIBASIC 236; 224 MG/ML; MG/ML
30 INJECTION, SOLUTION INTRAVENOUS ONCE
Refills: 0 | Status: COMPLETED | OUTPATIENT
Start: 2021-06-12 | End: 2021-06-12

## 2021-06-12 RX ORDER — MORPHINE SULFATE 50 MG/1
1 CAPSULE, EXTENDED RELEASE ORAL EVERY 4 HOURS
Refills: 0 | Status: DISCONTINUED | OUTPATIENT
Start: 2021-06-12 | End: 2021-06-12

## 2021-06-12 RX ORDER — HYDRALAZINE HCL 50 MG
10 TABLET ORAL ONCE
Refills: 0 | Status: COMPLETED | OUTPATIENT
Start: 2021-06-12 | End: 2021-06-12

## 2021-06-12 RX ORDER — ACETAMINOPHEN 500 MG
650 TABLET ORAL EVERY 6 HOURS
Refills: 0 | Status: DISCONTINUED | OUTPATIENT
Start: 2021-06-12 | End: 2021-06-14

## 2021-06-12 RX ORDER — OXYCODONE HYDROCHLORIDE 5 MG/1
10 TABLET ORAL EVERY 4 HOURS
Refills: 0 | Status: DISCONTINUED | OUTPATIENT
Start: 2021-06-12 | End: 2021-06-14

## 2021-06-12 RX ORDER — AMLODIPINE BESYLATE 2.5 MG/1
10 TABLET ORAL DAILY
Refills: 0 | Status: DISCONTINUED | OUTPATIENT
Start: 2021-06-12 | End: 2021-06-14

## 2021-06-12 RX ORDER — OXYCODONE HYDROCHLORIDE 5 MG/1
5 TABLET ORAL EVERY 4 HOURS
Refills: 0 | Status: DISCONTINUED | OUTPATIENT
Start: 2021-06-12 | End: 2021-06-14

## 2021-06-12 RX ORDER — LABETALOL HCL 100 MG
10 TABLET ORAL ONCE
Refills: 0 | Status: COMPLETED | OUTPATIENT
Start: 2021-06-12 | End: 2021-06-12

## 2021-06-12 RX ORDER — POTASSIUM CHLORIDE 20 MEQ
20 PACKET (EA) ORAL
Refills: 0 | Status: COMPLETED | OUTPATIENT
Start: 2021-06-12 | End: 2021-06-12

## 2021-06-12 RX ORDER — METOPROLOL TARTRATE 50 MG
25 TABLET ORAL
Refills: 0 | Status: DISCONTINUED | OUTPATIENT
Start: 2021-06-12 | End: 2021-06-14

## 2021-06-12 RX ORDER — HYDROMORPHONE HYDROCHLORIDE 2 MG/ML
0.5 INJECTION INTRAMUSCULAR; INTRAVENOUS; SUBCUTANEOUS ONCE
Refills: 0 | Status: DISCONTINUED | OUTPATIENT
Start: 2021-06-12 | End: 2021-06-12

## 2021-06-12 RX ORDER — ENOXAPARIN SODIUM 100 MG/ML
40 INJECTION SUBCUTANEOUS DAILY
Refills: 0 | Status: DISCONTINUED | OUTPATIENT
Start: 2021-06-13 | End: 2021-06-14

## 2021-06-12 RX ORDER — MAGNESIUM SULFATE 500 MG/ML
2 VIAL (ML) INJECTION ONCE
Refills: 0 | Status: COMPLETED | OUTPATIENT
Start: 2021-06-12 | End: 2021-06-12

## 2021-06-12 RX ORDER — LIDOCAINE 4 G/100G
1 CREAM TOPICAL DAILY
Refills: 0 | Status: DISCONTINUED | OUTPATIENT
Start: 2021-06-12 | End: 2021-06-12

## 2021-06-12 RX ADMIN — Medication 975 MILLIGRAM(S): at 05:42

## 2021-06-12 RX ADMIN — Medication 25 MILLIGRAM(S): at 17:07

## 2021-06-12 RX ADMIN — Medication 650 MILLIGRAM(S): at 11:45

## 2021-06-12 RX ADMIN — Medication 10 MILLIGRAM(S): at 08:35

## 2021-06-12 RX ADMIN — Medication 975 MILLIGRAM(S): at 05:12

## 2021-06-12 RX ADMIN — Medication 650 MILLIGRAM(S): at 18:05

## 2021-06-12 RX ADMIN — Medication 650 MILLIGRAM(S): at 17:07

## 2021-06-12 RX ADMIN — CHLORHEXIDINE GLUCONATE 1 APPLICATION(S): 213 SOLUTION TOPICAL at 05:10

## 2021-06-12 RX ADMIN — Medication 20 MILLIEQUIVALENT(S): at 07:58

## 2021-06-12 RX ADMIN — TRAMADOL HYDROCHLORIDE 25 MILLIGRAM(S): 50 TABLET ORAL at 07:58

## 2021-06-12 RX ADMIN — LIDOCAINE 1 PATCH: 4 CREAM TOPICAL at 07:19

## 2021-06-12 RX ADMIN — TRAMADOL HYDROCHLORIDE 25 MILLIGRAM(S): 50 TABLET ORAL at 08:28

## 2021-06-12 RX ADMIN — Medication 50 GRAM(S): at 06:52

## 2021-06-12 RX ADMIN — Medication 20 MILLIEQUIVALENT(S): at 09:07

## 2021-06-12 RX ADMIN — Medication 25 MILLIGRAM(S): at 10:23

## 2021-06-12 RX ADMIN — Medication 975 MILLIGRAM(S): at 01:14

## 2021-06-12 RX ADMIN — MORPHINE SULFATE 1 MILLIGRAM(S): 50 CAPSULE, EXTENDED RELEASE ORAL at 00:34

## 2021-06-12 RX ADMIN — POTASSIUM PHOSPHATE, MONOBASIC POTASSIUM PHOSPHATE, DIBASIC 83.33 MILLIMOLE(S): 236; 224 INJECTION, SOLUTION INTRAVENOUS at 07:58

## 2021-06-12 RX ADMIN — LIDOCAINE 1 PATCH: 4 CREAM TOPICAL at 01:41

## 2021-06-12 RX ADMIN — AMLODIPINE BESYLATE 10 MILLIGRAM(S): 2.5 TABLET ORAL at 12:49

## 2021-06-12 RX ADMIN — Medication 10 MILLIGRAM(S): at 10:23

## 2021-06-12 RX ADMIN — MORPHINE SULFATE 1 MILLIGRAM(S): 50 CAPSULE, EXTENDED RELEASE ORAL at 00:49

## 2021-06-12 RX ADMIN — HYDROMORPHONE HYDROCHLORIDE 0.5 MILLIGRAM(S): 2 INJECTION INTRAMUSCULAR; INTRAVENOUS; SUBCUTANEOUS at 01:14

## 2021-06-12 RX ADMIN — LIDOCAINE 1 PATCH: 4 CREAM TOPICAL at 14:34

## 2021-06-12 RX ADMIN — Medication 10 MILLIGRAM(S): at 11:41

## 2021-06-12 RX ADMIN — HYDROMORPHONE HYDROCHLORIDE 0.5 MILLIGRAM(S): 2 INJECTION INTRAMUSCULAR; INTRAVENOUS; SUBCUTANEOUS at 01:29

## 2021-06-12 NOTE — OCCUPATIONAL THERAPY INITIAL EVALUATION ADULT - PERTINENT HX OF CURRENT PROBLEM, REHAB EVAL
61y Male PMH EtOH abuse, HTN, presented to [facility] after fall down stairs while intoxicated, found with left frontal SAH and convexity SDH.

## 2021-06-12 NOTE — CHART NOTE - NSCHARTNOTEFT_GEN_A_CORE
s/p fall while intoxicated  patient is alert and oriented. complains of mild headache and right sided chest pain with palpation. this pain started 2 days ago after another fall    repeat ct head stable findings of left SDH, SAH. no neurodeficits. has not required [facility] protocol meds or precedix drip for alcohol withdrawal    avss  general: no acute distress, aox3  heent: mild right sided perioral swelling and right cheek swelling  cv: rrr  pulm: ctab  Abdomen: soft, NT/ND  Extremities: full range of motion, sensation grossly intact. no signs of asterixis    61 year old male s/p fall from standing height with questionable LOC. +alcohol found to have left SDH/SAH    repeat imaging performed with stable appearing findings from prior CT    -regular diet  -neurochecks qq4  -ambulate  diet  dvt chemoprophylaxis in 24 hours after incident  f/u neurosx recommendations s/p fall while intoxicated  patient is alert and oriented. complains of mild headache and right sided chest pain with palpation. this pain started 2 days ago after another fall    repeat ct head stable findings of left SDH, SAH. no neurodeficits. has not required [facility] protocol meds or precedix drip for alcohol withdrawal    avss  general: no acute distress, aox3  heent: mild right sided perioral swelling and right cheek swelling  cv: rrr  pulm: ctab  Abdomen: soft, NT/ND  Extremities: full range of motion, sensation grossly intact. no signs of asterixis    61 year old male s/p fall from standing height with questionable LOC. +alcohol found to have left SDH/SAH    repeat imaging performed with stable appearing findings from prior CT    -regular diet  -neurochecks qq4  -ambulate  diet  dvt chemoprophylaxis in 24 hours after incident  f/u neurosx recommendations./.

## 2021-06-12 NOTE — OCCUPATIONAL THERAPY INITIAL EVALUATION ADULT - ADDITIONAL COMMENTS
Pt rents a room within private home with no steps to enter.  Once inside there are 6 steps with HR, landing then 6 more stairs with HR to his room.   Pt relies on family, [employer] rides for community mobility.

## 2021-06-12 NOTE — OCCUPATIONAL THERAPY INITIAL EVALUATION ADULT - PERSONAL SAFETY AND JUDGMENT, REHAB EVAL
pt needed verbal cue for safety at edge of bed with LB dressing./intact/at risk behaviors demonstrated

## 2021-06-12 NOTE — CHART NOTE - NSCHARTNOTEFT_GEN_A_CORE
SICU TRANSFER NOTE  -----------------------------  ICU Admission Date: 6/11  Transfer Date: 06-12-21 @ 14:37    Admission Diagnosis: Traumatic SDH/SAH    Active Problems/injuries: Rib pain, hypertension    Procedures: XXXXX INCLUDE DATES    Consultants:  [ ] Cardiology  [ ] Endocrine  [ ] Infectious Disease  [ ] Medicine  [x ]Neurosurgery  [ ] Ortho       [ ] Weight Bearing Status:  [ ] Palliative       [ ] Advanced Directives:    [ ] Physical Medicine and Rehab       [ ] Disposition :   [ ] Plastics  [ ] Pulmonary    Medications  acetaminophen   Tablet .. 650 milliGRAM(s) Oral every 6 hours  amLODIPine   Tablet 10 milliGRAM(s) Oral daily  lidocaine   Patch 1 Patch Transdermal daily  metoprolol tartrate 25 milliGRAM(s) Oral two times a day  ondansetron Injectable 4 milliGRAM(s) IV Push every 6 hours PRN  oxyCODONE    IR 5 milliGRAM(s) Oral every 4 hours PRN  oxyCODONE    IR 10 milliGRAM(s) Oral every 4 hours PRN  senna 2 Tablet(s) Oral at bedtime      [x ] I attest I have reviewed and reconciled all medications prior to transfer    IV Fluids  lactated ringers.: Solution, 1000 milliLiter(s) infuse at 50 mL/Hr  Special Instructions: please hang after banana bag finishes  Provider's Contact #: [phone]  sodium chloride 0.9%: 1000 milliLiter(s)      with thiamine additive 100 milliGRAM(s)      with folic acid additive 1 milliGRAM(s)      with multivitamin additive 5 milliLiter(s), infuse at 150 mL/Hr; Stop After 6 Hours  Provider's Contact #: [phone]  lactated ringers.: Solution, 1000 milliLiter(s) infuse at 125 mL/Hr  Provider's Contact #: [phone]    Indication: XXXXXX    Antibiotics:    Indication: XXXXXXX End Date:XXXXXXX      I have discussed this case with KEEGAN Keene upon transfer and all questions regarding ICU course were answered.  The following items are to be followed up:    Pt neurologically intact, pupils equally and reactive BL  Repeat Head CT 6/12 Stable  On CIWA for ETOH misuse, no meds ordered    Ok for Lovenox 6/13    Pt has h/o uncontrolled HTN- he takes Toprol XL 50mg PO QD  Valsartan 320mg PO QD and Amlodipine 10mg PO QD, however  per Pharmacist at [facility] in [place], pt has not taken his medication since 2019  -Pt confirms he has not filled a prescription since 2019  -I restarted his BB as well as CCB, monitoring SBP parameters for traumatic head injry  -Monitor BP and restart Valsartan SICU TRANSFER NOTE  -----------------------------  ICU Admission Date: 6/11  Transfer Date: 06-12-21 @ 14:37    Admission Diagnosis: Traumatic SDH/SAH    Active Problems/injuries: Rib pain, hypertension    Procedures: XXXXX INCLUDE DATES    Consultants:  [ ] Cardiology  [ ] Endocrine  [ ] Infectious Disease  [ ] Medicine  [x ]Neurosurgery  [ ] Ortho       [ ] Weight Bearing Status:  [ ] Palliative       [ ] Advanced Directives:    [ ] Physical Medicine and Rehab       [ ] Disposition :   [ ] Plastics  [ ] Pulmonary    Medications  acetaminophen   Tablet .. 650 milliGRAM(s) Oral every 6 hours  amLODIPine   Tablet 10 milliGRAM(s) Oral daily  lidocaine   Patch 1 Patch Transdermal daily  metoprolol tartrate 25 milliGRAM(s) Oral two times a day  ondansetron Injectable 4 milliGRAM(s) IV Push every 6 hours PRN  oxyCODONE    IR 5 milliGRAM(s) Oral every 4 hours PRN  oxyCODONE    IR 10 milliGRAM(s) Oral every 4 hours PRN  senna 2 Tablet(s) Oral at bedtime      [x ] I attest I have reviewed and reconciled all medications prior to transfer    IV Fluids  lactated ringers.: Solution, 1000 milliLiter(s) infuse at 50 mL/Hr  Special Instructions: please hang after banana bag finishes  Provider's Contact #: [phone]  sodium chloride 0.9%: 1000 milliLiter(s)      with thiamine additive 100 milliGRAM(s)      with folic acid additive 1 milliGRAM(s)      with multivitamin additive 5 milliLiter(s), infuse at 150 mL/Hr; Stop After 6 Hours  Provider's Contact #: [phone]  lactated ringers.: Solution, 1000 milliLiter(s) infuse at 125 mL/Hr  Provider's Contact #: [phone]    Indication: XXXXXX    Antibiotics:    Indication: XXXXXXX End Date:XXXXXXX      I have discussed this case with KEEGAN Keene upon transfer and all questions regarding ICU course were answered.  The following items are to be followed up:    Pt neurologically intact, pupils equally and reactive BL  Repeat Head CT 6/12 Stable  On CIWA for ETOH misuse, no meds ordered    Ok for Lovenox 6/13    Pt has h/o uncontrolled HTN- he takes Toprol XL 50mg PO QD, Valsartan 320mg PO QD and Amlodipine 10mg PO QD, however, per Pharmacist at [facility] in [place], pt has not taken his medication since 2019  -Pt confirms he has not filled a prescription since 2019  -I restarted his BB as well as CCB, monitoring SBP parameters for traumatic head injury  -Monitor BP and restart Valsartan     Pt states he fell "while" ago and had fractured his right ribs.  Per CT chest 6/10/21, healing anterior Rt 7th rib fx noted  -Since repeat fall, pt c/o increased pain at site.  CXR obtained; no official read yet however no acute displacement noted  -PIC protocol ordered.  Current PIC score 8.    Pt will need SW for SBRT, PT/OT ordered

## 2021-06-12 NOTE — CHART NOTE - NSCHARTNOTEFT_GEN_A_CORE
called to see patient due to Rn concern for tongue deviation. Patient seen and examine bedside. No current complaints. Patient examined. No significant tongue deviation noted. Full appropriate ROM with equal strength. No sensation discrepancies. No slurring or facial droop. All facial motor examinations symmetrical. Patient feeling well. RN present during exam.

## 2021-06-13 DIAGNOSIS — S06.5X9A TRAUMATIC SUBDURAL HEMORRHAGE WITH LOSS OF CONSCIOUSNESS OF UNSPECIFIED DURATION, INITIAL ENCOUNTER: ICD-10-CM

## 2021-06-13 LAB
ANION GAP SERPL CALC-SCNC: 13 MMOL/L — SIGNIFICANT CHANGE UP (ref 5–17)
BASOPHILS # BLD AUTO: 0.03 K/UL — SIGNIFICANT CHANGE UP (ref 0–0.2)
BASOPHILS NFR BLD AUTO: 0.5 % — SIGNIFICANT CHANGE UP (ref 0–2)
BUN SERPL-MCNC: 6.9 MG/DL — LOW (ref 8–20)
CALCIUM SERPL-MCNC: 9.2 MG/DL — SIGNIFICANT CHANGE UP (ref 8.6–10.2)
CHLORIDE SERPL-SCNC: 100 MMOL/L — SIGNIFICANT CHANGE UP (ref 98–107)
CO2 SERPL-SCNC: 26 MMOL/L — SIGNIFICANT CHANGE UP (ref 22–29)
CREAT SERPL-MCNC: 0.49 MG/DL — LOW (ref 0.5–1.3)
EOSINOPHIL # BLD AUTO: 0.12 K/UL — SIGNIFICANT CHANGE UP (ref 0–0.5)
EOSINOPHIL NFR BLD AUTO: 2.2 % — SIGNIFICANT CHANGE UP (ref 0–6)
GLUCOSE SERPL-MCNC: 90 MG/DL — SIGNIFICANT CHANGE UP (ref 70–99)
HCT VFR BLD CALC: 42.1 % — SIGNIFICANT CHANGE UP (ref 39–50)
HGB BLD-MCNC: 14 G/DL — SIGNIFICANT CHANGE UP (ref 13–17)
IMM GRANULOCYTES NFR BLD AUTO: 0.2 % — SIGNIFICANT CHANGE UP (ref 0–1.5)
LYMPHOCYTES # BLD AUTO: 1.39 K/UL — SIGNIFICANT CHANGE UP (ref 1–3.3)
LYMPHOCYTES # BLD AUTO: 25.3 % — SIGNIFICANT CHANGE UP (ref 13–44)
MAGNESIUM SERPL-MCNC: 1.9 MG/DL — SIGNIFICANT CHANGE UP (ref 1.6–2.6)
MCHC RBC-ENTMCNC: 32 PG — SIGNIFICANT CHANGE UP (ref 27–34)
MCHC RBC-ENTMCNC: 33.3 GM/DL — SIGNIFICANT CHANGE UP (ref 32–36)
MCV RBC AUTO: 96.1 FL — SIGNIFICANT CHANGE UP (ref 80–100)
MONOCYTES # BLD AUTO: 0.54 K/UL — SIGNIFICANT CHANGE UP (ref 0–0.9)
MONOCYTES NFR BLD AUTO: 9.8 % — SIGNIFICANT CHANGE UP (ref 2–14)
NEUTROPHILS # BLD AUTO: 3.4 K/UL — SIGNIFICANT CHANGE UP (ref 1.8–7.4)
NEUTROPHILS NFR BLD AUTO: 62 % — SIGNIFICANT CHANGE UP (ref 43–77)
PHOSPHATE SERPL-MCNC: 3.8 MG/DL — SIGNIFICANT CHANGE UP (ref 2.4–4.7)
PLATELET # BLD AUTO: 173 K/UL — SIGNIFICANT CHANGE UP (ref 150–400)
POTASSIUM SERPL-MCNC: 3.1 MMOL/L — LOW (ref 3.5–5.3)
POTASSIUM SERPL-SCNC: 3.1 MMOL/L — LOW (ref 3.5–5.3)
RBC # BLD: 4.38 M/UL — SIGNIFICANT CHANGE UP (ref 4.2–5.8)
RBC # FLD: 13.3 % — SIGNIFICANT CHANGE UP (ref 10.3–14.5)
SODIUM SERPL-SCNC: 139 MMOL/L — SIGNIFICANT CHANGE UP (ref 135–145)
WBC # BLD: 5.49 K/UL — SIGNIFICANT CHANGE UP (ref 3.8–10.5)
WBC # FLD AUTO: 5.49 K/UL — SIGNIFICANT CHANGE UP (ref 3.8–10.5)

## 2021-06-13 PROCEDURE — 99232 SBSQ HOSP IP/OBS MODERATE 35: CPT | Mod: GC

## 2021-06-13 PROCEDURE — 99232 SBSQ HOSP IP/OBS MODERATE 35: CPT

## 2021-06-13 RX ORDER — MAGNESIUM SULFATE 500 MG/ML
2 VIAL (ML) INJECTION ONCE
Refills: 0 | Status: COMPLETED | OUTPATIENT
Start: 2021-06-13 | End: 2021-06-13

## 2021-06-13 RX ORDER — POTASSIUM CHLORIDE 20 MEQ
40 PACKET (EA) ORAL ONCE
Refills: 0 | Status: COMPLETED | OUTPATIENT
Start: 2021-06-13 | End: 2021-06-13

## 2021-06-13 RX ORDER — POTASSIUM CHLORIDE 20 MEQ
10 PACKET (EA) ORAL
Refills: 0 | Status: COMPLETED | OUTPATIENT
Start: 2021-06-13 | End: 2021-06-13

## 2021-06-13 RX ADMIN — Medication 650 MILLIGRAM(S): at 06:41

## 2021-06-13 RX ADMIN — LIDOCAINE 1 PATCH: 4 CREAM TOPICAL at 11:15

## 2021-06-13 RX ADMIN — Medication 650 MILLIGRAM(S): at 23:08

## 2021-06-13 RX ADMIN — Medication 100 MILLIEQUIVALENT(S): at 17:49

## 2021-06-13 RX ADMIN — Medication 50 GRAM(S): at 16:38

## 2021-06-13 RX ADMIN — Medication 25 MILLIGRAM(S): at 16:39

## 2021-06-13 RX ADMIN — Medication 100 MILLIEQUIVALENT(S): at 16:38

## 2021-06-13 RX ADMIN — Medication 650 MILLIGRAM(S): at 16:39

## 2021-06-13 RX ADMIN — LIDOCAINE 1 PATCH: 4 CREAM TOPICAL at 18:48

## 2021-06-13 RX ADMIN — AMLODIPINE BESYLATE 10 MILLIGRAM(S): 2.5 TABLET ORAL at 06:08

## 2021-06-13 RX ADMIN — Medication 650 MILLIGRAM(S): at 17:35

## 2021-06-13 RX ADMIN — Medication 100 MILLIEQUIVALENT(S): at 18:30

## 2021-06-13 RX ADMIN — Medication 650 MILLIGRAM(S): at 11:15

## 2021-06-13 RX ADMIN — Medication 650 MILLIGRAM(S): at 12:15

## 2021-06-13 RX ADMIN — LIDOCAINE 1 PATCH: 4 CREAM TOPICAL at 23:03

## 2021-06-13 RX ADMIN — ENOXAPARIN SODIUM 40 MILLIGRAM(S): 100 INJECTION SUBCUTANEOUS at 11:15

## 2021-06-13 RX ADMIN — Medication 40 MILLIEQUIVALENT(S): at 16:38

## 2021-06-13 RX ADMIN — Medication 650 MILLIGRAM(S): at 06:07

## 2021-06-13 RX ADMIN — Medication 650 MILLIGRAM(S): at 23:10

## 2021-06-13 RX ADMIN — Medication 25 MILLIGRAM(S): at 06:08

## 2021-06-13 NOTE — PROGRESS NOTE ADULT - ASSESSMENT
61y Male PMH EtOH abuse, HTN, presented to [facility] after fall down stairs while intoxicated, found with left frontal SAH and convexity SDH.  - Repeat CTH 6/12/21 stable  - No focal neurologic deficits, doing well    PLAN:  - D/w Dr. Tidwell  - No acute neurosurgical intervention warranted at this time  - Continue Q4 neuro checks while in house  - Pain control PRN, avoid oversedation  - SBP goal 100-150-- recommend optimizing as BP mostly not within parameters  - CIWA per ACS  - Hold ACT/APT  - SCDs/encourage OOB as tolerated  - No absolute neurosurgical contraindication to starting chemical DVT ppx if clinically indicated  - Supportive care/further medical management per ACS  - No further inpatient neurosurgical recommendations  - Follow up outpatient in 1 month with Dr. Tidwell (patient requesting 1 month in lieu of 2 weeks as he plans on going to a rehabilitation center for EtOH abuse post discharge)  - Reconsult PRN
61y Male PMH EtOH abuse, HTN, presented to [facility] after fall down stairs while intoxicated, found with left frontal SAH and convexity SDH.  - Repeat CTH stable  - No focal neurologic deficits    PLAN:  - D/w Dr. Tidwell  - No acute neurosurgical intervention warranted at this time  - Ok for Q4 neuro checks  - HOB 30 degrees  - Pain control PRN, avoid oversedation  - SBP goal 100-150  - Hold ACT/APT  - SCDs/encourage OOB as tolerated for DVT ppx for now  - Supportive care/further medical management per SICU
62 y/o M w/known EtOH use s/p fall yesterday and today, found to have Left-sided SDH and SAH, in addition to right nasal bone fx.     Neuro:     -Q1h neuro checks, possible downgrade   -CIWA protocol with meds    CV:   - NSR, hx of HTN non compliant to home medication lisinopril     Pulm:   - RA    GI/Nutrition:   - Advance diet, monitor PO tolerance     /Renal:   - voids freely  -f/u AM labs replete electrolytes PRN    ID:  - Afebrile    Endo:  - wnl, no issues    MSK:  - complaining of r chest wall tenderness previous hx of rib fractures f/u AM CXR   - repositioning for DTI prevention while in bed    Heme/DVT Prophylaxis:  - SCDs  - Repeat CT head stable, DVT PPX in 48hrs    Dispo:  - possible downgrade after 24hrs

## 2021-06-13 NOTE — SBIRT NOTE ADULT - NSSBIRTBRIEFINTDET_GEN_A_CORE
Screening results were reviewed with the patient and patient was provided information about healthy guidelines and potential negative consequences associated with level of risk. Motivation and readiness to reduce or stop use was discussed and goals and activities to make changes were suggested/offered. Patient reports he is interested in going to [facility] in [facility]. Patient reports he will make outreach to them and does need SW assistance at this time.
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
## 2021-06-13 NOTE — PROGRESS NOTE ADULT - PROBLEM SELECTOR PLAN 1
neurologic checks Q4 hours  pain control  pic protocol  incentive spirometer  pulmonary toliet  regular diet  continue bp medications if htn continues restart valsartan  pt.ot  dispo likely dc home with home assist

## 2021-06-13 NOTE — PROGRESS NOTE ADULT - ATTENDING COMMENTS
The patient was seen and examined  No new problems  Events noted    Neurologic:  GCS=15, awake and alert  Respiratory:  SaO2 Okay  Hemodynamic:  Normal  Renal:  Urine flow okay  Hematologic:  Hgb okay  ID:  No active issues    Plan:  Mobilize OOB  DVT prophylaxis  PT/OT  Can transfer to the floor
Pt seen and examined by me  agree with findings  pauline po  SAH/SDH stable  OK to DC when cleared by PT

## 2021-06-14 ENCOUNTER — TRANSCRIPTION ENCOUNTER (OUTPATIENT)
Age: 61
End: 2021-06-14

## 2021-06-14 VITALS
OXYGEN SATURATION: 98 % | TEMPERATURE: 99 F | DIASTOLIC BLOOD PRESSURE: 81 MMHG | SYSTOLIC BLOOD PRESSURE: 155 MMHG | HEART RATE: 90 BPM | RESPIRATION RATE: 18 BRPM

## 2021-06-14 LAB
ANION GAP SERPL CALC-SCNC: 9 MMOL/L — SIGNIFICANT CHANGE UP (ref 5–17)
BASOPHILS # BLD AUTO: 0.03 K/UL — SIGNIFICANT CHANGE UP (ref 0–0.2)
BASOPHILS NFR BLD AUTO: 0.5 % — SIGNIFICANT CHANGE UP (ref 0–2)
BUN SERPL-MCNC: 10.8 MG/DL — SIGNIFICANT CHANGE UP (ref 8–20)
CALCIUM SERPL-MCNC: 9.1 MG/DL — SIGNIFICANT CHANGE UP (ref 8.6–10.2)
CHLORIDE SERPL-SCNC: 101 MMOL/L — SIGNIFICANT CHANGE UP (ref 98–107)
CO2 SERPL-SCNC: 28 MMOL/L — SIGNIFICANT CHANGE UP (ref 22–29)
CREAT SERPL-MCNC: 0.54 MG/DL — SIGNIFICANT CHANGE UP (ref 0.5–1.3)
EOSINOPHIL # BLD AUTO: 0.14 K/UL — SIGNIFICANT CHANGE UP (ref 0–0.5)
EOSINOPHIL NFR BLD AUTO: 2.5 % — SIGNIFICANT CHANGE UP (ref 0–6)
GLUCOSE SERPL-MCNC: 91 MG/DL — SIGNIFICANT CHANGE UP (ref 70–99)
HCT VFR BLD CALC: 42.9 % — SIGNIFICANT CHANGE UP (ref 39–50)
HGB BLD-MCNC: 14.1 G/DL — SIGNIFICANT CHANGE UP (ref 13–17)
IMM GRANULOCYTES NFR BLD AUTO: 0.4 % — SIGNIFICANT CHANGE UP (ref 0–1.5)
LYMPHOCYTES # BLD AUTO: 1.79 K/UL — SIGNIFICANT CHANGE UP (ref 1–3.3)
LYMPHOCYTES # BLD AUTO: 32 % — SIGNIFICANT CHANGE UP (ref 13–44)
MAGNESIUM SERPL-MCNC: 1.9 MG/DL — SIGNIFICANT CHANGE UP (ref 1.6–2.6)
MCHC RBC-ENTMCNC: 31.8 PG — SIGNIFICANT CHANGE UP (ref 27–34)
MCHC RBC-ENTMCNC: 32.9 GM/DL — SIGNIFICANT CHANGE UP (ref 32–36)
MCV RBC AUTO: 96.8 FL — SIGNIFICANT CHANGE UP (ref 80–100)
MONOCYTES # BLD AUTO: 0.68 K/UL — SIGNIFICANT CHANGE UP (ref 0–0.9)
MONOCYTES NFR BLD AUTO: 12.2 % — SIGNIFICANT CHANGE UP (ref 2–14)
NEUTROPHILS # BLD AUTO: 2.93 K/UL — SIGNIFICANT CHANGE UP (ref 1.8–7.4)
NEUTROPHILS NFR BLD AUTO: 52.4 % — SIGNIFICANT CHANGE UP (ref 43–77)
PHOSPHATE SERPL-MCNC: 3.4 MG/DL — SIGNIFICANT CHANGE UP (ref 2.4–4.7)
PLATELET # BLD AUTO: 180 K/UL — SIGNIFICANT CHANGE UP (ref 150–400)
POTASSIUM SERPL-MCNC: 3.6 MMOL/L — SIGNIFICANT CHANGE UP (ref 3.5–5.3)
POTASSIUM SERPL-SCNC: 3.6 MMOL/L — SIGNIFICANT CHANGE UP (ref 3.5–5.3)
RBC # BLD: 4.43 M/UL — SIGNIFICANT CHANGE UP (ref 4.2–5.8)
RBC # FLD: 13.3 % — SIGNIFICANT CHANGE UP (ref 10.3–14.5)
SODIUM SERPL-SCNC: 138 MMOL/L — SIGNIFICANT CHANGE UP (ref 135–145)
WBC # BLD: 5.59 K/UL — SIGNIFICANT CHANGE UP (ref 3.8–10.5)
WBC # FLD AUTO: 5.59 K/UL — SIGNIFICANT CHANGE UP (ref 3.8–10.5)

## 2021-06-14 PROCEDURE — 99238 HOSP IP/OBS DSCHRG MGMT 30/<: CPT

## 2021-06-14 RX ORDER — AMLODIPINE BESYLATE 2.5 MG/1
1 TABLET ORAL
Qty: 7 | Refills: 0
Start: 2021-06-14 | End: 2021-06-20

## 2021-06-14 RX ORDER — MAGNESIUM OXIDE 400 MG ORAL TABLET 241.3 MG
400 TABLET ORAL ONCE
Refills: 0 | Status: COMPLETED | OUTPATIENT
Start: 2021-06-14 | End: 2021-06-14

## 2021-06-14 RX ORDER — ACETAMINOPHEN 500 MG
2 TABLET ORAL
Qty: 112 | Refills: 0
Start: 2021-06-14 | End: 2021-06-27

## 2021-06-14 RX ORDER — POTASSIUM CHLORIDE 20 MEQ
20 PACKET (EA) ORAL
Refills: 0 | Status: COMPLETED | OUTPATIENT
Start: 2021-06-14 | End: 2021-06-14

## 2021-06-14 RX ADMIN — AMLODIPINE BESYLATE 10 MILLIGRAM(S): 2.5 TABLET ORAL at 05:20

## 2021-06-14 RX ADMIN — Medication 650 MILLIGRAM(S): at 05:20

## 2021-06-14 RX ADMIN — Medication 650 MILLIGRAM(S): at 11:03

## 2021-06-14 RX ADMIN — Medication 650 MILLIGRAM(S): at 11:05

## 2021-06-14 RX ADMIN — Medication 25 MILLIGRAM(S): at 05:21

## 2021-06-14 RX ADMIN — Medication 20 MILLIEQUIVALENT(S): at 11:03

## 2021-06-14 RX ADMIN — ENOXAPARIN SODIUM 40 MILLIGRAM(S): 100 INJECTION SUBCUTANEOUS at 11:03

## 2021-06-14 RX ADMIN — LIDOCAINE 1 PATCH: 4 CREAM TOPICAL at 11:03

## 2021-06-14 RX ADMIN — Medication 20 MILLIEQUIVALENT(S): at 08:41

## 2021-06-14 RX ADMIN — Medication 650 MILLIGRAM(S): at 05:21

## 2021-06-14 RX ADMIN — MAGNESIUM OXIDE 400 MG ORAL TABLET 400 MILLIGRAM(S): 241.3 TABLET ORAL at 08:40

## 2021-06-14 NOTE — DIETITIAN INITIAL EVALUATION ADULT. - ADD RECOMMEND
Continue Ensure Enlive TID to optimize po intake and provide an additional 350kcal, 20g protein per serving. RX: MVI, Vitamin C daily. Encourage po intake and HBV protein. Monitor wts and labs.
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
## 2021-06-14 NOTE — DISCHARGE NOTE PROVIDER - CARE PROVIDER_API CALL
Franklin Tidwell)  Neurosurgery  [address] [place] [address]  Phone: [phone]  Fax: [phone]  Follow Up Time: 1 month

## 2021-06-14 NOTE — PHYSICAL THERAPY INITIAL EVALUATION ADULT - ADDITIONAL COMMENTS
Pt lives in a house with 6+6+6 steps to enter with  rails.   Pt owns medical equipment: none   Pt lives with: sister  Someone is always available to provide assist.

## 2021-06-14 NOTE — DISCHARGE NOTE PROVIDER - NSDCMRMEDTOKEN_GEN_ALL_CORE_FT
amoxicillin-clavulanate 875 mg-125 mg oral tablet: 1 tab(s) orally 2 times a day   ibuprofen 600 mg oral tablet: 1 tab(s) orally every 6 hours - for moderate pain - for mild pain    acetaminophen 325 mg oral tablet: 2 tab(s) orally every 6 hours  amLODIPine 10 mg oral tablet: 1 tab(s) orally once a day  amoxicillin-clavulanate 875 mg-125 mg oral tablet: 1 tab(s) orally 2 times a day   ibuprofen 600 mg oral tablet: 1 tab(s) orally every 6 hours - for moderate pain - for mild pain

## 2021-06-14 NOTE — DISCHARGE NOTE PROVIDER - NSDCFUADDINST_GEN_ALL_CORE_FT
1. Please follow up with your primary care provider to follow up with your high blood pressure.  2. Please follow up with  in 1 month
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
## 2021-06-14 NOTE — DISCHARGE NOTE PROVIDER - NSDCCPCAREPLAN_GEN_ALL_CORE_FT
PRINCIPAL DISCHARGE DIAGNOSIS  Diagnosis: Subdural hematoma  Assessment and Plan of Treatment:       SECONDARY DISCHARGE DIAGNOSES  Diagnosis: Nasal fracture  Assessment and Plan of Treatment:     Diagnosis: Subarachnoid hematoma  Assessment and Plan of Treatment:

## 2021-06-14 NOTE — DISCHARGE NOTE NURSING/CASE MANAGEMENT/SOCIAL WORK - PATIENT PORTAL LINK FT
You can access the FollowMyHealth Patient Portal offered by [facility] by registering at the following website: [email or web address]. By joining [employer]’s FollowMyHealth portal, you will also be able to view your health information using other applications (apps) compatible with our system.

## 2021-06-14 NOTE — DIETITIAN INITIAL EVALUATION ADULT. - PERTINENT LABORATORY DATA
06-14 Na138 mmol/L Glu 91 mg/dL K+ 3.6 mmol/L Cr  0.54 mg/dL BUN 10.8 mg/dL Phos 3.4 mg/dL Alb n/a   PAB n/a

## 2021-06-15 PROCEDURE — 82962 GLUCOSE BLOOD TEST: CPT

## 2021-06-15 PROCEDURE — 36415 COLL VENOUS BLD VENIPUNCTURE: CPT

## 2021-06-15 PROCEDURE — 84100 ASSAY OF PHOSPHORUS: CPT

## 2021-06-15 PROCEDURE — 85730 THROMBOPLASTIN TIME PARTIAL: CPT

## 2021-06-15 PROCEDURE — 86850 RBC ANTIBODY SCREEN: CPT

## 2021-06-15 PROCEDURE — 70450 CT HEAD/BRAIN W/O DYE: CPT

## 2021-06-15 PROCEDURE — U0003: CPT

## 2021-06-15 PROCEDURE — 80307 DRUG TEST PRSMV CHEM ANLYZR: CPT

## 2021-06-15 PROCEDURE — 71045 X-RAY EXAM CHEST 1 VIEW: CPT

## 2021-06-15 PROCEDURE — 86803 HEPATITIS C AB TEST: CPT

## 2021-06-15 PROCEDURE — 80053 COMPREHEN METABOLIC PANEL: CPT

## 2021-06-15 PROCEDURE — 83605 ASSAY OF LACTIC ACID: CPT

## 2021-06-15 PROCEDURE — 83735 ASSAY OF MAGNESIUM: CPT

## 2021-06-15 PROCEDURE — 85025 COMPLETE CBC W/AUTO DIFF WBC: CPT

## 2021-06-15 PROCEDURE — 96374 THER/PROPH/DIAG INJ IV PUSH: CPT

## 2021-06-15 PROCEDURE — 96375 TX/PRO/DX INJ NEW DRUG ADDON: CPT

## 2021-06-15 PROCEDURE — 86900 BLOOD TYPING SEROLOGIC ABO: CPT

## 2021-06-15 PROCEDURE — 81003 URINALYSIS AUTO W/O SCOPE: CPT

## 2021-06-15 PROCEDURE — 99285 EMERGENCY DEPT VISIT HI MDM: CPT | Mod: 25

## 2021-06-15 PROCEDURE — 86769 SARS-COV-2 COVID-19 ANTIBODY: CPT

## 2021-06-15 PROCEDURE — 85610 PROTHROMBIN TIME: CPT

## 2021-06-15 PROCEDURE — 72125 CT NECK SPINE W/O DYE: CPT

## 2021-06-15 PROCEDURE — 70486 CT MAXILLOFACIAL W/O DYE: CPT

## 2021-06-15 PROCEDURE — 86901 BLOOD TYPING SEROLOGIC RH(D): CPT

## 2021-06-15 PROCEDURE — U0005: CPT

## 2021-06-15 PROCEDURE — 72170 X-RAY EXAM OF PELVIS: CPT

## 2021-06-15 PROCEDURE — 83690 ASSAY OF LIPASE: CPT

## 2021-06-15 PROCEDURE — 80048 BASIC METABOLIC PNL TOTAL CA: CPT

## 2021-06-15 PROCEDURE — 93005 ELECTROCARDIOGRAM TRACING: CPT

## 2021-06-15 PROCEDURE — 97167 OT EVAL HIGH COMPLEX 60 MIN: CPT

## 2021-11-10 ENCOUNTER — EMERGENCY (EMERGENCY)
Facility: HOSPITAL | Age: 61
LOS: 1 days | Discharge: HOME | End: 2021-11-10
Attending: EMERGENCY MEDICINE
Payer: COMMERCIAL

## 2021-11-10 VITALS
OXYGEN SATURATION: 99 % | WEIGHT: 169.98 LBS | SYSTOLIC BLOOD PRESSURE: 140 MMHG | RESPIRATION RATE: 14 BRPM | TEMPERATURE: 98 F | DIASTOLIC BLOOD PRESSURE: 71 MMHG | HEART RATE: 88 BPM | HEIGHT: 72 IN

## 2021-11-10 PROCEDURE — 72125 CT NECK SPINE W/O DYE: CPT | Mod: 26,MG

## 2021-11-10 PROCEDURE — 12013 RPR F/E/E/N/L/M 2.6-5.0 CM: CPT

## 2021-11-10 PROCEDURE — 90471 IMMUNIZATION ADMIN: CPT

## 2021-11-10 PROCEDURE — 90715 TDAP VACCINE 7 YRS/> IM: CPT

## 2021-11-10 PROCEDURE — 70450 CT HEAD/BRAIN W/O DYE: CPT | Mod: 26,MG

## 2021-11-10 PROCEDURE — G1004: CPT

## 2021-11-10 PROCEDURE — 99284 EMERGENCY DEPT VISIT MOD MDM: CPT | Mod: 25

## 2021-11-10 PROCEDURE — 70450 CT HEAD/BRAIN W/O DYE: CPT | Mod: MG

## 2021-11-10 PROCEDURE — 99285 EMERGENCY DEPT VISIT HI MDM: CPT | Mod: 25

## 2021-11-10 PROCEDURE — 72125 CT NECK SPINE W/O DYE: CPT | Mod: MG

## 2021-11-10 PROCEDURE — 82962 GLUCOSE BLOOD TEST: CPT

## 2021-11-10 RX ORDER — TETANUS TOXOID, REDUCED DIPHTHERIA TOXOID AND ACELLULAR PERTUSSIS VACCINE, ADSORBED 5; 2.5; 8; 8; 2.5 [IU]/.5ML; [IU]/.5ML; UG/.5ML; UG/.5ML; UG/.5ML
0.5 SUSPENSION INTRAMUSCULAR ONCE
Refills: 0 | Status: COMPLETED | OUTPATIENT
Start: 2021-11-10 | End: 2021-11-10

## 2021-11-10 RX ADMIN — TETANUS TOXOID, REDUCED DIPHTHERIA TOXOID AND ACELLULAR PERTUSSIS VACCINE, ADSORBED 0.5 MILLILITER(S): 5; 2.5; 8; 8; 2.5 SUSPENSION INTRAMUSCULAR at 21:53
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
## 2021-11-10 NOTE — ED PROVIDER NOTE - CLINICAL SUMMARY MEDICAL DECISION MAKING FREE TEXT BOX
Patient with ETOH and head injury, denies other complaints. Priority CT head and C-spine, Tdap, laceration repair, and observe for sobriety.

## 2021-11-10 NOTE — ED PROVIDER NOTE - ATTENDING CONTRIBUTION TO CARE
Yasmin: I performed a face to face evaluation of this patient and performed a full history and physical examination on the patient.  I agree with the resident's history, physical examination, and plan of the patient unless otherwise noted. My brief assessment is as follows: see note

## 2021-11-10 NOTE — ED ADULT NURSE NOTE - NSIMPLEMENTINTERV_GEN_ALL_ED
Implemented All Fall with Harm Risk Interventions:  [place] to call system. Call bell, personal items and telephone within reach. Instruct patient to call for assistance. Room bathroom lighting operational. Non-slip footwear when patient is off stretcher. Physically safe environment: no spills, clutter or unnecessary equipment. Stretcher in lowest position, wheels locked, appropriate side rails in place. Provide visual cue, wrist band, yellow gown, etc. Monitor gait and stability. Monitor for mental status changes and reorient to person, place, and time. Review medications for side effects contributing to fall risk. Reinforce activity limits and safety measures with patient and family. Provide visual clues: red socks.
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
## 2021-11-10 NOTE — ED PROVIDER NOTE - PATIENT PORTAL LINK FT
You can access the FollowMyHealth Patient Portal offered by [facility] by registering at the following website: [email or web address]. By joining [employer]’s FollowMyHealth portal, you will also be able to view your health information using other applications (apps) compatible with our system.

## 2021-11-10 NOTE — ED PROVIDER NOTE - PHYSICAL EXAMINATION
Gen: No acute distress, non toxic  HEENT: Mucous membranes moist, pink conjunctivae, EOMI  CV: RRR, nl s1/s2.  Resp: CTAB, normal rate and effort  GI: Abdomen soft, NT, ND. No rebound, no guarding  : No CVAT  Neuro: A&O x 3, moving all 4 extremities; (+) slurred speech, following commands without gross deficits  MSK: No spine or joint tenderness to palpation, full ROM,  Skin: (+) 3cm laceration to left forehead, in c-collar. No rashes, perfused.

## 2021-11-10 NOTE — ED ADULT TRIAGE NOTE - NSSEPSISSUSPECTED_ED_A_ED
Pt noted to have increased work of breathing and increased spells. FI02 increased to 100%. Dr. Guillen at bedside and Curosurf ordered. Dr attempted intubation, pt noted to have increased secretions. RN at bedside suctioned pt. Pt then continued to have desaturations and episode of bradycardia.RT at bedside giving PPV HR fell below 60 and pt color pale and dusky. Pt saturations unreadable. Compressions began for 1 min. Pt HR and saturations increased and  Successfully intubated pt.    No

## 2021-11-10 NOTE — ED PROCEDURE NOTE - PROCEDURE ADDITIONAL DETAILS
3cm L forehead laceration. Anesthetized with lidocaine w/ epi, copiously irrigated, closed with 4 simple interrupted stitches of 5-0 prolene. Bandaged.

## 2021-11-10 NOTE — ED PROVIDER NOTE - NSFOLLOWUPINSTRUCTIONS_ED_ALL_ED_FT
1. Keep sutures dry for 24 hours. After the first day, you can shower but don't soak the sutures (no swimming).   2. Change bandage and apply bacitracin or neosporin every day.   3. Return to your doctor or to the emergency room for suture removal (4 sutures) in 5-7 days.  4. Return to the emergency room for any new or worsening symptoms, such as redness, swelling, pus in the wound.

## 2021-11-10 NOTE — ED PROVIDER NOTE - NS ED ROS FT
ROS: (+) laceration to head; No fever/chills. No eye pain/changes in vision, No ear pain/sore throat/dysphagia, No chest pain/palpitations. No SOB/cough/. No abdominal pain, N/V/D, no black/bloody bm. No dysuria/frequency/discharge, No headache. No Dizziness.    No rashes. No numbness/tingling/weakness.

## 2021-11-11 VITALS
HEART RATE: 80 BPM | TEMPERATURE: 98 F | OXYGEN SATURATION: 95 % | DIASTOLIC BLOOD PRESSURE: 100 MMHG | SYSTOLIC BLOOD PRESSURE: 165 MMHG
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
## 2021-11-23 ENCOUNTER — EMERGENCY (EMERGENCY)
Facility: HOSPITAL | Age: 61
LOS: 1 days | Discharge: HOME | End: 2021-11-23
Attending: EMERGENCY MEDICINE
Payer: COMMERCIAL

## 2021-11-23 VITALS
RESPIRATION RATE: 17 BRPM | SYSTOLIC BLOOD PRESSURE: 142 MMHG | OXYGEN SATURATION: 99 % | DIASTOLIC BLOOD PRESSURE: 89 MMHG | HEIGHT: 72 IN | HEART RATE: 98 BPM | TEMPERATURE: 98 F

## 2021-11-23 PROCEDURE — L9995: CPT

## 2021-11-23 PROCEDURE — G0463: CPT

## 2021-11-23 NOTE — ED PROVIDER NOTE - PATIENT PORTAL LINK FT
You can access the FollowMyHealth Patient Portal offered by [facility] by registering at the following website: [email or web address]. By joining [employer]’s FollowMyHealth portal, you will also be able to view your health information using other applications (apps) compatible with our system.

## 2021-11-23 NOTE — ED PROVIDER NOTE - ATTENDING CONTRIBUTION TO CARE
+Suture removed from forehead. I, Dr. Serrano, performed a face to face bedside interview with this patient regarding history of present illness, review of symptoms and relevant past medical, social and family history.  I completed an independent physical examination.  I have also reviewed the ACP's note(s) and discussed the plan with the ACP.

## 2021-11-23 NOTE — ED PROVIDER NOTE - NSFOLLOWUPINSTRUCTIONS_ED_ALL_ED_FT
Please follow up with your doctor within 48 hours.     SEEK IMMEDIATE MEDICAL CARE IF YOU HAVE ANY OF THE FOLLOWING SYMPTOMS: swelling around the wound, worsening pain, drainage from the wound, red streaking going away from your wound, inability to move finger or toe near the laceration, or discoloration of skin near the laceration.

## 2021-11-23 NOTE — ED PROVIDER NOTE - OBJECTIVE STATEMENT
60 y/o male presents for suture removal s/p left forehead laceration. Denies HA. Denies any pain to wound site, redness or discharge.

## 2021-11-23 NOTE — ED PROVIDER NOTE - IV ALTEPLASE EXCL ABS HIDDEN
MALE ANNUAL EXAM note      History    Sedrick Heaton is an 21 year old male who presents for an annual exam.  He has no concerns for today.       medical history    Past Medical History:   Diagnosis Date   • Acne    • Attention deficit hyperactivity disorder (ADHD)        SURGICAL history    Past Surgical History:   Procedure Laterality Date   • EYE SURGERY      right cyst removal in 2012       social history    Social History     Social History   • Marital status: Single     Spouse name: N/A   • Number of children: N/A   • Years of education: N/A     Social History Main Topics   • Smoking status: Never Smoker   • Smokeless tobacco: Never Used   • Alcohol use None   • Drug use: Unknown   • Sexual activity: Not Asked     Other Topics Concern   • None     Social History Narrative   • None       family history    Family History   Problem Relation Age of Onset   • Asthma Mother    • NEGATIVE FAMILY HX OF Father        mEDICATIONS    Current Outpatient Prescriptions   Medication Sig   • ketoconazole (NIZORAL) 2 % cream Apply once a day to scaling prn.   • erythromycin (EMGEL) 2 % gel Apply each night to acne areas of the face.   • clobetasol (TEMOVATE) 0.05 % topical solution Apply once to twice daily as needed for scaly, itchy rash of the scalp.   • benzoyl peroxide 5 % gel Apply each night to the acne areas of the face before going to bed.   • minocycline (MINOCIN,DYNACIN) 100 MG capsule Take one by mouth twice a day in the middle of a meal   • amphetamine-dextroamphetamine XR (ADDERALL XR) 15 MG 24 hr capsule Take 1 capsule by mouth daily.     No current facility-administered medications for this visit.        aLLERGIES    ALLERGIES:  No Known Allergies    REVIEW OF SYSTEMS  GENERAL:  Patient reports no issues  Eye Problem(s):negative  ENT Problem(s):negative  Cardiovascular problem(s):negative  Respiratory problem(s):negative  Gastrointestinal problem(s):negative GI  Genitourinary  problem(s):negative  Musculoskeletal problem(s):negative  Integumentary problem(s):negative  Endocrine problem(s):occasional (rare) sudden lightheadedness and weakness that resolves with eating  Hematologic and/or Lymphatic problem(s):negative     Physical Exam    Vital Signs:    Vitals:    11/02/17 0854   BP: 110/70   Pulse: 80   Resp: 16   Temp: 97.1 °F (36.2 °C)   TempSrc: Oral   Weight: 63 kg   Height: 5' 9\" (1.753 m)     General:  Well developed, well nourished. In no apparent distress.    Eyes:  PERRL, EOMI. Conjunctivae pink. Sclerae anicteric.    HENT:  Normocephalic, atraumatic. Bilateral external ears are normal. Mucosal membranes of the lips and gums are moist. External nose is normal. Oropharynx is clear with no postnasal drainage.  Neck:  Supple. Nontender. Normal range of motion. No cervical or supraclavicular lymphadenopathy.   No thyromegaly.  No other neck masses.  Trachea midline.  Respiratory:  Normal respiratory effort. Lungs clear to auscultation bilaterally.    Cardiovascular:  Regular rate and rhythm. No murmurs, rubs, or gallops. Normal S1 and S2. No S3 or S4. No JVD. No carotid bruits. 2+ dorsalis pedis pulses bilaterally. No peripheral edema.  Gastrointestinal:  Soft. Nontender. Nondistended. Normal bowel sounds. No pulsatile or other abdominal masses. No hepatosplenomegaly.    Neurologic:  Alert and oriented x 3.   Integumentary:  Warm. Dry. Pink. No rashes or lesions. No wounds.    Lymphatic:  No axillary or inguinal lymphadenopathy.    Psychiatric: Cooperative. Appropriate mood and affect. Normal judgment.          Assessment & Plan    22 y/o male without history of chronic disease, normal physical exam. Routine vaccines up to date. Seasonal flu shot given today. Sedrick has been seeing Dr. Woody for anxiety and ADHD, he has been using Adderall as needed.              show

## 2022-01-01 NOTE — ED PROCEDURE NOTE - CPROC ED ANATOMIC LOCATION1
"Circumcision      Date/Time: 2022   08:33 EDT  Performed by: Roxie Isaac MD  Consent: Verbal consent obtained. Written consent obtained.  Risks and benefits: risks, benefits and alternatives were discussed  Consent given by: parent  Patient identity confirmed: leg band  Time out: Immediately prior to procedure a \"time out\" was called to verify the correct patient, procedure, equipment, support staff and site/side marked as required.  Anatomy: penis normal  Restraint: standard molded circumcision board  Pain Management: 1 mL 1% lidocaine injected in a ring-block fashion at 10 o'clock, 2 o'clock, 4 o'clock, and 8 o'clock  Clamp(s) used: Mogen  Hemostatic agents: none  Complications? No  Comments: EBL minimal      Roxie Isaac MD  08:33 EDT  08/16/22    " nose

## 2022-03-18 NOTE — PHYSICAL THERAPY INITIAL EVALUATION ADULT - GENERAL OBSERVATIONS, REHAB EVAL
Pt received in bed, + IV Loc, +Tele No respiratory distress. No stridor, Lungs sounds clear with good aeration bilaterally.

## 2022-07-29 ENCOUNTER — EMERGENCY (EMERGENCY)
Facility: HOSPITAL | Age: 62
LOS: 1 days | Discharge: HOME | End: 2022-07-29
Attending: EMERGENCY MEDICINE
Payer: COMMERCIAL

## 2022-07-29 VITALS
OXYGEN SATURATION: 97 % | TEMPERATURE: 97 F | HEART RATE: 72 BPM | SYSTOLIC BLOOD PRESSURE: 144 MMHG | DIASTOLIC BLOOD PRESSURE: 91 MMHG | HEIGHT: 72 IN | RESPIRATION RATE: 20 BRPM | WEIGHT: 220.02 LBS

## 2022-07-29 VITALS
TEMPERATURE: 98 F | SYSTOLIC BLOOD PRESSURE: 137 MMHG | RESPIRATION RATE: 20 BRPM | HEART RATE: 78 BPM | DIASTOLIC BLOOD PRESSURE: 80 MMHG | OXYGEN SATURATION: 97 %

## 2022-07-29 PROCEDURE — 72125 CT NECK SPINE W/O DYE: CPT | Mod: 26,MA

## 2022-07-29 PROCEDURE — 70450 CT HEAD/BRAIN W/O DYE: CPT | Mod: 26,MA

## 2022-07-29 PROCEDURE — 99285 EMERGENCY DEPT VISIT HI MDM: CPT

## 2022-07-29 NOTE — ED PROVIDER NOTE - OBJECTIVE STATEMENT
Pertinent PMH/PSH/FHx/SHx and Review of Systems contained within:  Patient presents to the ED for reported intox with head injury.  Reportedly found stumbling.  Has been here in the past for the same.  VSS.  Patient responding and moving all extremities.  Scant dried blood on right forehead.  Otherwise baseline.  no other trauma on exam.  Non toxic.  Well appearing. No aggravating or relieving factors. No other pertinent PMH.  No other pertinent PSH.  No other pertinent FHx.  Patient denies illicit substance use. No fever/chills, No photophobia/eye pain/changes in vision, No ear pain/sore throat/dysphagia, No chest pain/palpitations, no SOB/cough/wheeze/stridor, No abdominal pain, No N/V/D, no dysuria/frequency/discharge, No neck/back pain, no rash, no changes in neurological status/function.

## 2022-07-29 NOTE — ED ADULT TRIAGE NOTE - CHIEF COMPLAINT QUOTE
BIBEMS after being found stumbling in the road with ETOH. Pt states he drank a lot of beer today. PT reports he fell once on the ground but did not hit his head. GCS15. Denies injury or pain. No Si/HI or drug use. changed into yellow gown, belongs labeled and secured.

## 2022-07-29 NOTE — ED PROVIDER NOTE - PHYSICAL EXAMINATION
Gen: Alert, NAD  Head: NC, AT, PERRL, EOMI, normal lids/conjunctiva  ENT: normal hearing, patent oropharynx without erythema/exudate, uvula midline  Neck: +supple, no tenderness/meningismus/JVD, +Trachea midline  Pulm: Bilateral BS, normal resp effort, no wheeze/stridor/retractions  CV: RRR, no R/G, +dist pulses  Abd: soft, NT/ND, +BS, no hepatosplenomegaly  Mskel: no edema/erythema/cyanosis  Skin: no rash  Neuro: Alert, no gross sensory/motor deficits,

## 2022-07-29 NOTE — ED PROVIDER NOTE - CLINICAL SUMMARY MEDICAL DECISION MAKING FREE TEXT BOX
Patient presents with reported intox.  VSS.  FS 74.  CT pending results.  Non toxic.  Well appearing. pending sobriety.  Patient signed out to incoming physician.  All decisions regarding the progression of care will be made at their discretion.

## 2022-07-29 NOTE — ED PROVIDER NOTE - PATIENT PORTAL LINK FT
You can access the FollowMyHealth Patient Portal offered by [facility] by registering at the following website: [email or web address]. By joining [employer]’s FollowMyHealth portal, you will also be able to view your health information using other applications (apps) compatible with our system.

## 2022-07-30 PROCEDURE — 82962 GLUCOSE BLOOD TEST: CPT

## 2022-07-30 PROCEDURE — 72125 CT NECK SPINE W/O DYE: CPT | Mod: MA

## 2022-07-30 PROCEDURE — 99284 EMERGENCY DEPT VISIT MOD MDM: CPT | Mod: 25

## 2022-07-30 PROCEDURE — 70450 CT HEAD/BRAIN W/O DYE: CPT | Mod: MA

## 2022-07-30 NOTE — ED ADULT NURSE NOTE - OBJECTIVE STATEMENT
Patient arrived to ED with complaint of ETOH intoxication. Sleeping in stretcher. No acute distress noted.

## 2023-03-23 NOTE — DIETITIAN INITIAL EVALUATION ADULT. - PHYSCIAL ASSESSMENT
well nourished

## 2023-07-25 ENCOUNTER — EMERGENCY (EMERGENCY)
Facility: HOSPITAL | Age: 63
LOS: 1 days | Discharge: HOME | End: 2023-07-25
Attending: EMERGENCY MEDICINE
Payer: COMMERCIAL

## 2023-07-25 VITALS
HEART RATE: 77 BPM | DIASTOLIC BLOOD PRESSURE: 97 MMHG | SYSTOLIC BLOOD PRESSURE: 158 MMHG | OXYGEN SATURATION: 96 % | TEMPERATURE: 98 F | RESPIRATION RATE: 18 BRPM | WEIGHT: 190.04 LBS

## 2023-07-25 VITALS
HEART RATE: 62 BPM | SYSTOLIC BLOOD PRESSURE: 172 MMHG | DIASTOLIC BLOOD PRESSURE: 80 MMHG | RESPIRATION RATE: 18 BRPM | OXYGEN SATURATION: 98 % | TEMPERATURE: 98 F

## 2023-07-25 DIAGNOSIS — F10.10 ALCOHOL ABUSE, UNCOMPLICATED: ICD-10-CM

## 2023-07-25 DIAGNOSIS — I71.00 DISSECTION OF UNSPECIFIED SITE OF AORTA: ICD-10-CM

## 2023-07-25 DIAGNOSIS — I24.9 ACUTE ISCHEMIC HEART DISEASE, UNSPECIFIED: ICD-10-CM

## 2023-07-25 DIAGNOSIS — Z98.890 OTHER SPECIFIED POSTPROCEDURAL STATES: Chronic | ICD-10-CM

## 2023-07-25 DIAGNOSIS — I16.1 HYPERTENSIVE EMERGENCY: ICD-10-CM

## 2023-07-25 LAB
ALBUMIN SERPL ELPH-MCNC: 4.3 G/DL — SIGNIFICANT CHANGE UP (ref 3.3–5.2)
ALP SERPL-CCNC: 101 U/L — SIGNIFICANT CHANGE UP (ref 40–120)
ALT FLD-CCNC: 42 U/L — HIGH
ANION GAP SERPL CALC-SCNC: 12 MMOL/L — SIGNIFICANT CHANGE UP (ref 5–17)
APTT BLD: 29.3 SEC — SIGNIFICANT CHANGE UP (ref 27.5–35.5)
AST SERPL-CCNC: 56 U/L — HIGH
BASOPHILS # BLD AUTO: 0.03 K/UL — SIGNIFICANT CHANGE UP (ref 0–0.2)
BASOPHILS NFR BLD AUTO: 0.6 % — SIGNIFICANT CHANGE UP (ref 0–2)
BILIRUB SERPL-MCNC: 0.5 MG/DL — SIGNIFICANT CHANGE UP (ref 0.4–2)
BLD GP AB SCN SERPL QL: SIGNIFICANT CHANGE UP
BUN SERPL-MCNC: 8.6 MG/DL — SIGNIFICANT CHANGE UP (ref 8–20)
CALCIUM SERPL-MCNC: 8.7 MG/DL — SIGNIFICANT CHANGE UP (ref 8.4–10.5)
CHLORIDE SERPL-SCNC: 103 MMOL/L — SIGNIFICANT CHANGE UP (ref 96–108)
CO2 SERPL-SCNC: 27 MMOL/L — SIGNIFICANT CHANGE UP (ref 22–29)
CREAT SERPL-MCNC: 0.61 MG/DL — SIGNIFICANT CHANGE UP (ref 0.5–1.3)
EGFR: 109 ML/MIN/1.73M2 — SIGNIFICANT CHANGE UP
EOSINOPHIL # BLD AUTO: 0.16 K/UL — SIGNIFICANT CHANGE UP (ref 0–0.5)
EOSINOPHIL NFR BLD AUTO: 3.2 % — SIGNIFICANT CHANGE UP (ref 0–6)
GLUCOSE SERPL-MCNC: 89 MG/DL — SIGNIFICANT CHANGE UP (ref 70–99)
HCT VFR BLD CALC: 40.8 % — SIGNIFICANT CHANGE UP (ref 39–50)
HGB BLD-MCNC: 14 G/DL — SIGNIFICANT CHANGE UP (ref 13–17)
IMM GRANULOCYTES NFR BLD AUTO: 0 % — SIGNIFICANT CHANGE UP (ref 0–0.9)
INR BLD: 1.04 RATIO — SIGNIFICANT CHANGE UP (ref 0.88–1.16)
LYMPHOCYTES # BLD AUTO: 2.54 K/UL — SIGNIFICANT CHANGE UP (ref 1–3.3)
LYMPHOCYTES # BLD AUTO: 50.5 % — HIGH (ref 13–44)
MCHC RBC-ENTMCNC: 30.5 PG — SIGNIFICANT CHANGE UP (ref 27–34)
MCHC RBC-ENTMCNC: 34.3 GM/DL — SIGNIFICANT CHANGE UP (ref 32–36)
MCV RBC AUTO: 88.9 FL — SIGNIFICANT CHANGE UP (ref 80–100)
MONOCYTES # BLD AUTO: 0.69 K/UL — SIGNIFICANT CHANGE UP (ref 0–0.9)
MONOCYTES NFR BLD AUTO: 13.7 % — SIGNIFICANT CHANGE UP (ref 2–14)
NEUTROPHILS # BLD AUTO: 1.61 K/UL — LOW (ref 1.8–7.4)
NEUTROPHILS NFR BLD AUTO: 32 % — LOW (ref 43–77)
NT-PROBNP SERPL-SCNC: 44 PG/ML — SIGNIFICANT CHANGE UP (ref 0–300)
PLATELET # BLD AUTO: 192 K/UL — SIGNIFICANT CHANGE UP (ref 150–400)
POTASSIUM SERPL-MCNC: 3.8 MMOL/L — SIGNIFICANT CHANGE UP (ref 3.5–5.3)
POTASSIUM SERPL-SCNC: 3.8 MMOL/L — SIGNIFICANT CHANGE UP (ref 3.5–5.3)
PROT SERPL-MCNC: 7.1 G/DL — SIGNIFICANT CHANGE UP (ref 6.6–8.7)
PROTHROM AB SERPL-ACNC: 12.1 SEC — SIGNIFICANT CHANGE UP (ref 10.5–13.4)
RBC # BLD: 4.59 M/UL — SIGNIFICANT CHANGE UP (ref 4.2–5.8)
RBC # FLD: 13.7 % — SIGNIFICANT CHANGE UP (ref 10.3–14.5)
SODIUM SERPL-SCNC: 142 MMOL/L — SIGNIFICANT CHANGE UP (ref 135–145)
TROPONIN T SERPL-MCNC: <0.01 NG/ML — SIGNIFICANT CHANGE UP (ref 0–0.06)
WBC # BLD: 5.03 K/UL — SIGNIFICANT CHANGE UP (ref 3.8–10.5)
WBC # FLD AUTO: 5.03 K/UL — SIGNIFICANT CHANGE UP (ref 3.8–10.5)

## 2023-07-25 PROCEDURE — 83880 ASSAY OF NATRIURETIC PEPTIDE: CPT

## 2023-07-25 PROCEDURE — 86901 BLOOD TYPING SEROLOGIC RH(D): CPT

## 2023-07-25 PROCEDURE — 85610 PROTHROMBIN TIME: CPT

## 2023-07-25 PROCEDURE — 86850 RBC ANTIBODY SCREEN: CPT

## 2023-07-25 PROCEDURE — 99223 1ST HOSP IP/OBS HIGH 75: CPT

## 2023-07-25 PROCEDURE — 80053 COMPREHEN METABOLIC PANEL: CPT

## 2023-07-25 PROCEDURE — 36415 COLL VENOUS BLD VENIPUNCTURE: CPT

## 2023-07-25 PROCEDURE — 93010 ELECTROCARDIOGRAM REPORT: CPT

## 2023-07-25 PROCEDURE — 93306 TTE W/DOPPLER COMPLETE: CPT | Mod: 26

## 2023-07-25 PROCEDURE — 84484 ASSAY OF TROPONIN QUANT: CPT

## 2023-07-25 PROCEDURE — G0378: CPT

## 2023-07-25 PROCEDURE — 85730 THROMBOPLASTIN TIME PARTIAL: CPT

## 2023-07-25 PROCEDURE — 71045 X-RAY EXAM CHEST 1 VIEW: CPT

## 2023-07-25 PROCEDURE — 93005 ELECTROCARDIOGRAM TRACING: CPT

## 2023-07-25 PROCEDURE — 71045 X-RAY EXAM CHEST 1 VIEW: CPT | Mod: 26

## 2023-07-25 PROCEDURE — 99285 EMERGENCY DEPT VISIT HI MDM: CPT | Mod: 25

## 2023-07-25 PROCEDURE — 85025 COMPLETE CBC W/AUTO DIFF WBC: CPT

## 2023-07-25 PROCEDURE — 93306 TTE W/DOPPLER COMPLETE: CPT

## 2023-07-25 PROCEDURE — 86900 BLOOD TYPING SEROLOGIC ABO: CPT

## 2023-07-25 RX ORDER — AMLODIPINE BESYLATE 2.5 MG/1
10 TABLET ORAL DAILY
Refills: 0 | Status: DISCONTINUED | OUTPATIENT
Start: 2023-07-25 | End: 2023-08-01

## 2023-07-25 RX ORDER — NITROGLYCERIN 6.5 MG
0.5 CAPSULE, EXTENDED RELEASE ORAL ONCE
Refills: 0 | Status: COMPLETED | OUTPATIENT
Start: 2023-07-25 | End: 2023-07-25

## 2023-07-25 RX ORDER — ASPIRIN/CALCIUM CARB/MAGNESIUM 324 MG
324 TABLET ORAL ONCE
Refills: 0 | Status: COMPLETED | OUTPATIENT
Start: 2023-07-25 | End: 2023-07-25

## 2023-07-25 RX ORDER — PANTOPRAZOLE SODIUM 20 MG/1
40 TABLET, DELAYED RELEASE ORAL ONCE
Refills: 0 | Status: DISCONTINUED | OUTPATIENT
Start: 2023-07-25 | End: 2023-08-01

## 2023-07-25 RX ORDER — PANTOPRAZOLE SODIUM 20 MG/1
1 TABLET, DELAYED RELEASE ORAL
Qty: 30 | Refills: 0
Start: 2023-07-25 | End: 2023-08-23

## 2023-07-25 RX ORDER — LOSARTAN POTASSIUM 100 MG/1
1 TABLET, FILM COATED ORAL
Qty: 30 | Refills: 0
Start: 2023-07-25 | End: 2023-08-23

## 2023-07-25 RX ORDER — HYDROCHLOROTHIAZIDE 25 MG
1 TABLET ORAL
Qty: 30 | Refills: 0
Start: 2023-07-25 | End: 2023-08-23

## 2023-07-25 RX ORDER — LOSARTAN POTASSIUM 100 MG/1
25 TABLET, FILM COATED ORAL DAILY
Refills: 0 | Status: DISCONTINUED | OUTPATIENT
Start: 2023-07-25 | End: 2023-08-01

## 2023-07-25 RX ORDER — AMLODIPINE BESYLATE 2.5 MG/1
1 TABLET ORAL
Qty: 30 | Refills: 0
Start: 2023-07-25 | End: 2023-08-23

## 2023-07-25 RX ADMIN — Medication 324 MILLIGRAM(S): at 04:20

## 2023-07-25 RX ADMIN — Medication 0.5 INCH(S): at 07:04

## 2023-07-25 NOTE — ED PROVIDER NOTE - CLINICAL SUMMARY MEDICAL DECISION MAKING FREE TEXT BOX
62 yo male PMHx HTN, daily drinker (no h/o withdrawal) presents to ED c/o exertional chest pain x2 days. Not compliant with antihypertensives. EKG nonischemic, unchanged from 2021. Patient placed in CDU for serial enzymes, telemetry, cardiology consult.

## 2023-07-25 NOTE — ED CDU PROVIDER DISPOSITION NOTE - CLINICAL COURSE
Patient with daily O2 use presented for chest pain with exertional dyspnea, EKG non-ischemic, was seen by cardiology, had 3 negative trops and a normal echo. BP medication sent to pharmacy, patient can follow up outpatient with Dr. Hernandez.

## 2023-07-25 NOTE — CONSULT NOTE ADULT - PROBLEM SELECTOR RECOMMENDATION 9
- SBP >200 on admission with chest pain   - did not take medications for 1 week due to running out of meds  - on amlodipine 10mg daily at home  - resume amlodipine 10mg daily  - start HCTZ 25mg daily   - start losartan 25mg daily   - goal SBP is <160

## 2023-07-25 NOTE — CONSULT NOTE ADULT - PROBLEM SELECTOR RECOMMENDATION 3
- Diet/lifestyle modifications and medication compliance heavily reinforced   - ETOH cessation heavily reinforced

## 2023-07-25 NOTE — CONSULT NOTE ADULT - PROBLEM SELECTOR RECOMMENDATION 2
- would trend troponin  - EKG w/ left bundle, not new, was there since 2021 but never had workup   - complains of angina/anginal equivalents  - can use nitroglycerin for chest pain   - check echo r/o valvulopathy, reduced EF, and WMA  - depending upon echo results/ trend of troponin and if there is resolution of chest pain after BP is controlled, will make plan for inpatient vs outpatient non-invasive ischemic workup. CTA vs stress test.

## 2023-07-25 NOTE — ED PROVIDER NOTE - NS ED ATTENDING STATEMENT MOD
This was a shared visit with the KILLIAN. I reviewed and verified the documentation and independently performed the documented:

## 2023-07-25 NOTE — ED ADULT NURSE NOTE - OBJECTIVE STATEMENT
pt to ED with complaints of chest pain. pmh HTN. pt states he has been having chest pain with SOB x 2 days. pt states pain is a sharp pain in left side of chest. pain is currently 4/10. pt states pain worsens during inspiration and exertion. pt endorses he has not taken his BP meds x 1 week after not picking them up from the pharmacy. pt denies any N/V/D. pt daily ETOH drinker 6 drinks/day. pt denies any seizures from withdrawal. pt lying in stretcher A+O x3. RR even and unlabored. NSR on CM.

## 2023-07-25 NOTE — ED CDU PROVIDER DISPOSITION NOTE - PATIENT PORTAL LINK FT
You can access the FollowMyHealth Patient Portal offered by [facility] by registering at the following website: [email or web address]. By joining [employer]’s FollowMyHealth portal, you will also be able to view your health information using other applications (apps) compatible with our system.

## 2023-07-25 NOTE — ED CDU PROVIDER INITIAL DAY NOTE - OBJECTIVE STATEMENT
Poor historian.   62 yo male PMHx HTN, daily drinker (no h/o withdrawal) presents to ED c/o chest pain x2 days. Constant, left sided, nonradiating. No exacerbating/alleviating factors. Reports worse with exertion, improved with rest. Last evaluated by cardiologist several years ago (before COVID). Believes it was all normal. Cannot remember name. Has not taken BP medication in 1 week. Does not know medication. No further complaints at this time.   Denies recent travel/surgeries, diaphoresis, nausea, vomiting, leg pain/swelling.

## 2023-07-25 NOTE — ED ADULT NURSE REASSESSMENT NOTE - NS ED NURSE REASSESS COMMENT FT1
Pt is resting in stretcher comfortably at this time, no apparent distress noted at this time. PT on CM in NSR and . Pt safety maintained. Pt denies any complaints at this time.

## 2023-07-25 NOTE — ED CDU PROVIDER DISPOSITION NOTE - CARE PROVIDER_API CALL
Eddie Hernandez  Interventional Cardiology  [address], [address]  [place] [address]  Phone: [phone]  Fax: [phone]  Follow Up Time: Urgent

## 2023-07-25 NOTE — CONSULT NOTE ADULT - ASSESSMENT
This is a 63 yr old M current ETOH abuse (beer/vodka 8 drinks multiple times per week), with HTN, and HLD who presents with chest pain and hypertension consistent w/ hypertensive emergency. Patient has baseline LBBB, has chest pain/back pain which follows unstable anginal pattern. Pt states he hasnt seen a doctor in >4 years and has been heavily drinking.

## 2023-07-25 NOTE — ED ADULT TRIAGE NOTE - CHIEF COMPLAINT QUOTE
Ambulatory to ED c/o L sided chest pressure w/ difficulty breathing x2 days. HX HTN, EKG in progress.

## 2023-07-25 NOTE — ED PROVIDER NOTE - ATTENDING APP SHARED VISIT CONTRIBUTION OF CARE
63y M w/ hx HTN, EtOH use, presents for chest pain. Pt reports 2 days of pain to left side, worse with exertion. Has not seen a doctor in years. Last drank last night; normally has 6-7 beers daily but denies hx of DTs. On exam, pt in no acute distress. Heart RRR, lungs CTAB, no reproducible chest wall tenderness, no pedal edema. No acute findings on initial workup including EKG/CXR/labs. Placed in observation pending cardiology eval, serial trops and tele monitoring.

## 2023-07-25 NOTE — ED PROVIDER NOTE - OBJECTIVE STATEMENT
Poor historian.   64 yo male PMHx HTN presents to ED c/o chest pain x2 days. Constant, left sided, nonradiating. No exacerbating/alleviating factors. Reports worse with exertion, improved with rest. Last evaluated by cardiologist several years ago (before COVID). Believes it was all normal. Cannot remember name. Has not taken BP medication in 1 week. Does not know medication. No further complaints at this time.   Denies recent travel/surgeries, diaphoresis, nausea, vomiting, leg pain/swelling. Poor historian.   62 yo male PMHx HTN, daily drinker (no h/o withdrawal) presents to ED c/o chest pain x2 days. Constant, left sided, nonradiating. No exacerbating/alleviating factors. Reports worse with exertion, improved with rest. Last evaluated by cardiologist several years ago (before COVID). Believes it was all normal. Cannot remember name. Has not taken BP medication in 1 week. Does not know medication. No further complaints at this time.   Denies recent travel/surgeries, diaphoresis, nausea, vomiting, leg pain/swelling.

## 2023-07-25 NOTE — ED CDU PROVIDER INITIAL DAY NOTE - CLINICAL SUMMARY MEDICAL DECISION MAKING FREE TEXT BOX
64 yo male PMHx HTN, daily drinker (no h/o withdrawal) presents to ED c/o exertional chest pain x2 days. Not compliant with antihypertensives. EKG nonischemic, unchanged from 2021. Patient placed in CDU for serial enzymes, telemetry, cardiology consult.

## 2023-07-25 NOTE — ED ADULT NURSE NOTE - NS ED NURSE DISCH DISPOSITION
REFERRING PROVIDER  Paloma Gandhi MD     HISTORY OF PRESENT CONDITION  CC: Stage (at least) IIA2 Squamous Cell Cervical Cancer surveillance    Lesley Estrada is a 49 y.o.  referred for surveillance of cervical cancer post primary treatment with chemo/RT. She was under surveillance for high risk cervical pathology found in May 2022. In 2023, colposcopy and biopsy revealed SCC. On PET, pelvic MR and pelvic exam, the disease appeared to be clinically stage I (localized to the cervix). On 2023, EUA by Dr. Rhodes revealed tumor extension into the anterior vagina and possibly left lateral vaginal apex.  She was subsequently treated with chemoradiation (Dr. Gandhi and Dr. Lejeune) which was completed in May 2023 with significant treatment response per imaging.     Data Reviewed:   22 PAP Smear: ASC-US, HPV + (16, high risk)     22 PAP Smear: HSIL, HPV + (16, high risk)    1/3/23 Cervical Biopsy, 6 o'clock, 8 o'clock, 10 o'clock and ECC: Squamous cell carcinoma, invasive, moderately differentiated. Comment: Squamous cell carcinoma comprises almost the entirely of the tissue. Carcinoma involves the full thickness of these biopsies and invades at least 3 mm.     23 PET: Increased hypermetabolic activity is present within the cervical os measuring up to 6.4 SUV. No hypermetabolic pelvic or para-aortic lymphadenopathy. No subserosal extension. No hydronephrosis.     2/10/23 MRI Pelvis: uterus 9.2 x 5 x 6.5. An intermediate T2 signal hypoenhancing cervical mass is present measuring approximately 4.3 x 3.1 x 2.7 cm.  No spread into the parametrial fat is appreciated. There are two irregularly-shaped lymph nodes in the left external iliac chain measuring approximately 6 mm in short axis with T2 signal intensity similar to the primary cervical tumor. These are not hypermetabolic on the recent PET potentially due to small size, but by MRI are highly suspicious for delmar metastasis. No additional  suspicious lymph nodes are seen.  Both ovaries are visualized and are unremarkable.      03/21/23 EUA  FINDINGS:The vagina was free of any distal involvement. However, the area of the cervix had a 5 cm firm fixed tumor with a growth into the anterior vaginal wall. There was also distinctly palpable tumor, extending into the proximal left parametrium and into the left uterosacral area. The tumor did not extend it to the pelvic sidewall. The right parametria did not have obvious tumor. The remainder of the surrounding areas were free of any concern for involvement, including the bladder and rectum.     04/05/23-05/31/23 chemoradiation regimen: 40mg Cisplatin weekly 6 cycles (Medical Oncologist: Elizabeth Kennedy Lejeune, MD). Pelvic radiation concurrent wtih chemo 45Gy, total treatments: 25 with brachytherapy boost- 30Gy in 6 fractions (Radiation Oncologist: Dr Dow Prellop)    05/05/23 MRI (post treatment):  There is now heterogeneous T2 signal in the cervix. On postcontrast imaging, the prior clearly defined hypoenhancing cervical mass is no longer confidently seen. The suspicious left external iliac chain lymph node has decreased in size, now measuring 5 mm in short axis. No new or enlarging retroperitoneal lymph nodes are seen. The primary cervical mass is no longer identified with confidence. This is consistent with excellent treatment response. 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       Past Medical History:   Diagnosis Date    ADD (attention deficit disorder)     Cancer 2023    Cervical Cancer    Essential (primary) hypertension     Generalized anxiety disorder       Current Outpatient Medications   Medication Sig    acetaminophen (TYLENOL) 500 MG tablet Take 1,000 mg by mouth every 6 (six) hours as needed for Pain.    amLODIPine (NORVASC) 10 MG tablet  Take 10 mg by mouth once daily.    buPROPion (WELLBUTRIN XL) 150 MG TB24 tablet Take 150 mg by mouth every morning.    PROZAC 20 mg capsule Take 20 mg by mouth every morning.    diazePAM (VALIUM) 10 MG Tab Take 10 mg by mouth. Take 1 hr po 1 hr prior to procedure    diphenoxylate-atropine 2.5-0.025 mg (LOMOTIL) 2.5-0.025 mg per tablet Take 3 tablets by mouth 3 (three) times daily.    gabapentin (NEURONTIN) 300 MG capsule     HYDROcodone-acetaminophen (NORCO)  mg per tablet     LORazepam (ATIVAN) 1 MG tablet Take 1 tablet (1 mg total) by mouth 2 (two) times a day. As needed (Patient not taking: Reported on 8/8/2023)    OLANZapine (ZYPREXA) 5 MG tablet Take 1 tablet (5 mg total) by mouth nightly. (Patient not taking: Reported on 6/29/2023)    ondansetron (ZOFRAN) 8 MG tablet Take 1 tablet (8 mg total) by mouth every 8 (eight) hours as needed for Nausea. (Patient not taking: Reported on 6/29/2023)    pantoprazole (PROTONIX) 40 MG tablet Take 1 tablet (40 mg total) by mouth once daily. (Patient not taking: Reported on 8/8/2023)    phenazopyridine (PYRIDIUM) 200 MG tablet Take 200 mg by mouth 2 (two) times daily as needed for Pain.    polyethylene glycol (GLYCOLAX) 17 gram PwPk Take 17 g by mouth once daily. (Patient not taking: Reported on 5/22/2023)    prochlorperazine (COMPAZINE) 10 MG tablet Take 1 tablet (10 mg total) by mouth every 6 (six) hours as needed (nausea). (Patient not taking: Reported on 6/29/2023)    valACYclovir (VALTREX) 1000 MG tablet Take 2 tablets (2,000 mg total) by mouth 2 (two) times daily. for 1 day    zolpidem (AMBIEN) 5 MG Tab      Current Facility-Administered Medications   Medication    alteplase injection 2 mg    alteplase injection 2 mg    heparin, porcine (PF) 100 unit/mL injection flush 500 Units    heparin, porcine (PF) 100 unit/mL injection flush 500 Units    heparin, porcine (PF) 100 unit/mL injection flush 500 Units    heparin, porcine (PF) 100 unit/mL injection flush 500 Units     heparin, porcine (PF) 100 unit/mL injection flush 500 Units    heparin, porcine (PF) 100 unit/mL injection flush 500 Units    sodium chloride 0.9% flush 10 mL    sodium chloride 0.9% flush 10 mL    sodium chloride 0.9% flush 10 mL    sodium chloride 0.9% flush 10 mL    sodium chloride 0.9% flush 10 mL    sodium chloride 0.9% flush 10 mL     Review of patient's allergies indicates:  No Known Allergies    Past Surgical History:   Procedure Laterality Date    Arm surgery Left     HAD STAPH INFECTION  IN ARM AND WAS DRAINED    PLACEMENT, MEDIPORT N/A 4/10/2023    Procedure: Placement, Mediport;  Surgeon: Alexus Pollack MD;  Location: [facility] OR;  Service: General;  Laterality: N/A;        FAMILY HISTORY  Family History   Problem Relation Age of Onset    Breast cancer Mother 56    Other Father         Had stints in his heart    Ovarian cancer Maternal Grandmother         50s    Breast cancer Maternal Grandmother         late 80s    Stomach cancer Maternal Grandfather         late 60s, early 70s    Kidney failure Paternal Grandmother     Diabetes Paternal Grandmother     Suicide Paternal Grandfather     Breast cancer Paternal Aunt 75       SOCIAL HISTORY    reports that she has never smoked. She has never used smokeless tobacco. She reports current alcohol use. She reports that she does not use drugs.    REVIEW OF SYSTEMS  Review of Systems   Constitutional:  Negative for activity change, appetite change, chills, fatigue, fever and unexpected weight change.   Respiratory:  Negative for cough, chest tightness and shortness of breath.    Cardiovascular:  Negative for chest pain, palpitations and leg swelling.   Gastrointestinal:  Negative for abdominal distention, abdominal pain, blood in stool, constipation, diarrhea, nausea and vomiting.   Genitourinary:  Negative for dyspareunia, dysuria, frequency, hematuria, menstrual problem, pelvic pain, urgency, vaginal bleeding and vaginal discharge.   Musculoskeletal:  Negative  for arthralgias and myalgias.   Skin:  Negative for color change and rash.   Neurological:  Negative for dizziness, weakness and light-headedness.   Hematological:  Negative for adenopathy.   Psychiatric/Behavioral:  Negative for decreased concentration, dysphoric mood and sleep disturbance. The patient is not nervous/anxious.        OBJECTIVE   Vitals:    08/08/23 1258   BP: 133/77   Pulse: 109   Resp: 18      Body mass index is 34.08 kg/m².     Physical Exam:   Constitutional: She is oriented to person, place, and time. She appears well-developed and well-nourished.    HENT:   Head: Normocephalic and atraumatic.    Eyes: Conjunctivae and EOM are normal. No scleral icterus.    Neck: No thyromegaly present.    Cardiovascular:  Normal rate and regular rhythm.      Exam reveals no cyanosis and no edema.        Pulmonary/Chest: Effort normal and breath sounds normal. No respiratory distress.        Abdominal: Soft. She exhibits no distension and no mass. There is no abdominal tenderness. There is no rebound and no guarding. No hernia.     Genitourinary:    Genitourinary Comments: Vulva - normal without lesions  Vagina - healthy without lesions  Uterus - non-tender  Cervix - no gross tumor, challenging to see entirely due to vaginal length (may need long Graves on future exams).  No scarring, no palpable abnormalities.    Adnexa - no masses or fullness, non-tender             Musculoskeletal: Moves all extremeties.      Lymphadenopathy:     She has no cervical adenopathy.    Neurological: She is alert and oriented to person, place, and time.    Skin: No rash noted. No cyanosis or erythema.    Psychiatric: She has a normal mood and affect.     ECOG status: 0    LABORATORY DATA  Lab data reviewed.    RADIOLOGICAL DATA  Radiology data reviewed.    PATHOLOGY DATA  Pathology data reviewed.    ASSESSMENT    Stage II cervical cancer s/p chemoradiation.      The patient was seen, examined, and counseled by me.  She remains DANDRE.   She will continue with routine surveillance and will contact us promptly if she has any problems or questions in the interim.    Per SGO 2017 guidelines, for high risk (advanced stage, treated with primary chemotherapy/radiation therapy or surgery plus adjuvant therapy) follow-up is every 3 months for 2 years, then every 6 months years 3 - 5 after which they are seen annually by a generalist or gynecologic oncologist.  Cytology can be considered.  There is insufficient evidence for detection of cancer recurrence but may have value in the detection of lower genital tract neoplasia.  There is insufficient data to support routine use of radiographic imaging.     PLAN   PET CT 3 months post treatment (will be ordered by Dr. Gandhi  Follow-up with oncology team every 3 - 4 months.  Happy to alternate / coordinate with Dr. Gandhi / Lejeune Chad Hamilton, MD          Discharged

## 2023-07-25 NOTE — ED ADULT NURSE REASSESSMENT NOTE - NS ED NURSE REASSESS COMMENT FT1
Assumed care of pt from RN Citlali. AOx4. Pt resting comfortably in stretcher. Pt denies any complaints at the moment. Denies chest pain, SOB, abd pain, back pain, headaches, dizziness, n/v/d, fevers, and chills. Pt on cardiac monitor in NSR 66 HR and  96% RA. Respirations even and unlabored. Skin warm to touch. Nitro patch observed on chest wall. pt educated on plan of care, pt able to successfully teach back plan of care to RN, RN will continue to reeducate pt during hospital stay.

## 2023-07-25 NOTE — ED PROVIDER NOTE - PHYSICAL EXAMINATION
General: In NAD, non-toxic/well-appearing.  Skin: No rashes or lesions. Warm, dry, color normal for race.   Head: Normocephalic/atraumatic.   Eyes: Sclera anicteric, conjunctivae clear b/l. PERRLA, EOMI.    Neck: Supple, FROM.   Cardio: Rate and rhythm regular. No audible murmur.  PV: No edema of feet/ankles.   Resp: Breath sounds vesicular, symmetrical and without rales, rhonchi or wheezing b/l.  Abd: Non-distended. Soft, non-tender. No rebound, guarding.   MSK: MAEx4. FROM.   Neuro: A&Ox3. Appears nonfocal.

## 2023-08-14 ENCOUNTER — EMERGENCY (EMERGENCY)
Facility: HOSPITAL | Age: 63
LOS: 1 days | Discharge: HOME | End: 2023-08-14
Attending: EMERGENCY MEDICINE
Payer: COMMERCIAL

## 2023-08-14 VITALS
RESPIRATION RATE: 18 BRPM | TEMPERATURE: 98 F | HEART RATE: 102 BPM | OXYGEN SATURATION: 94 % | SYSTOLIC BLOOD PRESSURE: 130 MMHG | DIASTOLIC BLOOD PRESSURE: 81 MMHG

## 2023-08-14 DIAGNOSIS — Z98.890 OTHER SPECIFIED POSTPROCEDURAL STATES: Chronic | ICD-10-CM

## 2023-08-14 LAB
ALBUMIN SERPL ELPH-MCNC: 4.1 G/DL — SIGNIFICANT CHANGE UP (ref 3.3–5.2)
ALP SERPL-CCNC: 82 U/L — SIGNIFICANT CHANGE UP (ref 40–120)
ALT FLD-CCNC: 44 U/L — HIGH
ANION GAP SERPL CALC-SCNC: 15 MMOL/L — SIGNIFICANT CHANGE UP (ref 5–17)
AST SERPL-CCNC: 65 U/L — HIGH
BASOPHILS # BLD AUTO: 0.03 K/UL — SIGNIFICANT CHANGE UP (ref 0–0.2)
BASOPHILS NFR BLD AUTO: 0.5 % — SIGNIFICANT CHANGE UP (ref 0–2)
BILIRUB SERPL-MCNC: 0.3 MG/DL — LOW (ref 0.4–2)
BUN SERPL-MCNC: 11 MG/DL — SIGNIFICANT CHANGE UP (ref 8–20)
CALCIUM SERPL-MCNC: 8.4 MG/DL — SIGNIFICANT CHANGE UP (ref 8.4–10.5)
CHLORIDE SERPL-SCNC: 106 MMOL/L — SIGNIFICANT CHANGE UP (ref 96–108)
CO2 SERPL-SCNC: 23 MMOL/L — SIGNIFICANT CHANGE UP (ref 22–29)
CREAT SERPL-MCNC: 0.63 MG/DL — SIGNIFICANT CHANGE UP (ref 0.5–1.3)
EGFR: 108 ML/MIN/1.73M2 — SIGNIFICANT CHANGE UP
EOSINOPHIL # BLD AUTO: 0.1 K/UL — SIGNIFICANT CHANGE UP (ref 0–0.5)
EOSINOPHIL NFR BLD AUTO: 1.8 % — SIGNIFICANT CHANGE UP (ref 0–6)
GLUCOSE SERPL-MCNC: 129 MG/DL — HIGH (ref 70–99)
HCT VFR BLD CALC: 38.9 % — LOW (ref 39–50)
HGB BLD-MCNC: 13.3 G/DL — SIGNIFICANT CHANGE UP (ref 13–17)
IMM GRANULOCYTES NFR BLD AUTO: 0.2 % — SIGNIFICANT CHANGE UP (ref 0–0.9)
LYMPHOCYTES # BLD AUTO: 2.04 K/UL — SIGNIFICANT CHANGE UP (ref 1–3.3)
LYMPHOCYTES # BLD AUTO: 36.8 % — SIGNIFICANT CHANGE UP (ref 13–44)
MCHC RBC-ENTMCNC: 31 PG — SIGNIFICANT CHANGE UP (ref 27–34)
MCHC RBC-ENTMCNC: 34.2 GM/DL — SIGNIFICANT CHANGE UP (ref 32–36)
MCV RBC AUTO: 90.7 FL — SIGNIFICANT CHANGE UP (ref 80–100)
MONOCYTES # BLD AUTO: 0.55 K/UL — SIGNIFICANT CHANGE UP (ref 0–0.9)
MONOCYTES NFR BLD AUTO: 9.9 % — SIGNIFICANT CHANGE UP (ref 2–14)
NEUTROPHILS # BLD AUTO: 2.81 K/UL — SIGNIFICANT CHANGE UP (ref 1.8–7.4)
NEUTROPHILS NFR BLD AUTO: 50.8 % — SIGNIFICANT CHANGE UP (ref 43–77)
PLATELET # BLD AUTO: 207 K/UL — SIGNIFICANT CHANGE UP (ref 150–400)
POTASSIUM SERPL-MCNC: 3.4 MMOL/L — LOW (ref 3.5–5.3)
POTASSIUM SERPL-SCNC: 3.4 MMOL/L — LOW (ref 3.5–5.3)
PROT SERPL-MCNC: 6.7 G/DL — SIGNIFICANT CHANGE UP (ref 6.6–8.7)
RBC # BLD: 4.29 M/UL — SIGNIFICANT CHANGE UP (ref 4.2–5.8)
RBC # FLD: 14.9 % — HIGH (ref 10.3–14.5)
SODIUM SERPL-SCNC: 144 MMOL/L — SIGNIFICANT CHANGE UP (ref 135–145)
WBC # BLD: 5.54 K/UL — SIGNIFICANT CHANGE UP (ref 3.8–10.5)
WBC # FLD AUTO: 5.54 K/UL — SIGNIFICANT CHANGE UP (ref 3.8–10.5)

## 2023-08-14 PROCEDURE — 72125 CT NECK SPINE W/O DYE: CPT | Mod: 26,MA

## 2023-08-14 PROCEDURE — 85025 COMPLETE CBC W/AUTO DIFF WBC: CPT

## 2023-08-14 PROCEDURE — 80053 COMPREHEN METABOLIC PANEL: CPT

## 2023-08-14 PROCEDURE — 72170 X-RAY EXAM OF PELVIS: CPT | Mod: 26

## 2023-08-14 PROCEDURE — 36415 COLL VENOUS BLD VENIPUNCTURE: CPT

## 2023-08-14 PROCEDURE — 71045 X-RAY EXAM CHEST 1 VIEW: CPT

## 2023-08-14 PROCEDURE — 70450 CT HEAD/BRAIN W/O DYE: CPT | Mod: MA

## 2023-08-14 PROCEDURE — 99284 EMERGENCY DEPT VISIT MOD MDM: CPT | Mod: 25

## 2023-08-14 PROCEDURE — 99284 EMERGENCY DEPT VISIT MOD MDM: CPT

## 2023-08-14 PROCEDURE — 70450 CT HEAD/BRAIN W/O DYE: CPT | Mod: 26,MA

## 2023-08-14 PROCEDURE — 12013 RPR F/E/E/N/L/M 2.6-5.0 CM: CPT

## 2023-08-14 PROCEDURE — 72125 CT NECK SPINE W/O DYE: CPT | Mod: MA

## 2023-08-14 PROCEDURE — 71045 X-RAY EXAM CHEST 1 VIEW: CPT | Mod: 26

## 2023-08-14 PROCEDURE — 72170 X-RAY EXAM OF PELVIS: CPT

## 2023-08-14 NOTE — ED ADULT NURSE NOTE - CAS DISCH CONDITION
Reassessment:   35yMalePatient is a 35y old  Male who presents with a chief complaint of Alcohol abuse (01 Oct 2020 13:12)      Factors impacting intake: [ ] none [ ] nausea  [ ] vomiting [ ] diarrhea [ ] constipation  [ ]chewing problems [ ] swallowing issues  [ X] other: ETOH abuse, cirrhosis, ascites, esophageal varices, cardiomegaly  Diet Presciption: Diet, Soft:   1000mL Fluid Restriction ([id number]) (10-01-20 @ 10:52)    Intake: Visited pt. alert, out of bed in chair, observed lunch tray intake >75% & tolerating, also on fluid restriction - 1000 ml, d/w PCA, pt. with improved po intake at most meals, consuming %, low Na 132/labs noted, followed by Nephro & Hepatology team, presently no GI distress, encourage po intake, s/p paracentesis 2.8L jorden fluid drained, awaiting liver transplant, rec. c/w diet as ordered & MD to restrict fluids as needed. RD available     Daily Weight in k.6 (30 Sep 2020 05:15)  Weight in k (29 Sep 2020 04:46)  Weight in k.5 (28 Sep 2020 05:47)  Weight in k.4 (27 Sep 2020 05:10)  Weight in k.1 (26 Sep 2020 05:04)    % Weight Change: wt. variation noted due fluid shift.    Pertinent Medications: MEDICATIONS  (STANDING):  ciprofloxacin     Tablet 500 milliGRAM(s) Oral daily  ergocalciferol 33536 Unit(s) Oral <User Schedule>  folic acid 1 milliGRAM(s) Oral daily  furosemide    Tablet 20 milliGRAM(s) Oral daily  influenza   Vaccine 0.5 milliLiter(s) IntraMuscular once  lactulose Syrup 20 Gram(s) Oral three times a day  multivitamin 1 Tablet(s) Oral daily  mupirocin 2% Nasal 1 Application(s) Nasal two times a day  nadolol 20 milliGRAM(s) Oral daily  pantoprazole    Tablet 40 milliGRAM(s) Oral two times a day  rifAXIMin 550 milliGRAM(s) Oral two times a day  spironolactone 50 milliGRAM(s) Oral daily    MEDICATIONS  (PRN):  LORazepam   Injectable 2 milliGRAM(s) IV Push every 4 hours PRN Agitation    Pertinent Labs: 10-01 Na132 mmol/L<L> Glu 128 mg/dL<H> K+ 4.0 mmol/L Cr  0.78 mg/dL BUN 11 mg/dL 10-01 Phos 3.3 mg/dL 10-01 Alb 1.9 g/dL<L> 09-15 Chol 66 mg/dL LDL 37 mg/dL HDL 15 mg/dL<L> Trig 68 mg/dL     CAPILLARY BLOOD GLUCOSE    Skin: intact, left/right leg edema 2+ noted     Estimated Needs:   [X ] no change since previous assessment  [ ] recalculated:     Previous Nutrition Diagnosis:   [ ] Inadequate Energy Intake [ X]Inadequate Oral Intake [ ] Excessive Energy Intake   [ ] Underweight [ ] Increased Nutrient Needs [ ] Overweight/Obesity   [ ] Altered GI Function [ ] Unintended Weight Loss [ ] Food & Nutrition Related Knowledge Deficit [ ] Malnutrition     Nutrition Diagnosis is [X] ongoing  [ ] resolved [ ] not applicable     New Nutrition Diagnosis: [ ] not applicable     Interventions: To meet nutrition needs   Recommend  [ ] Change Diet To:  [ ] Nutrition Supplement  [ ] Nutrition Support  [X ] Other: Add MVI/minerals daily as medically feasible     Monitoring and Evaluation:   [X ] PO intake [ x ] Tolerance to diet prescription [ x ] weights [ x ] labs[ x ] follow up per protocol  [ ] other: Stable

## 2023-08-14 NOTE — ED PROVIDER NOTE - ATTENDING CONTRIBUTION TO CARE
I have seen the patient with the KILLIAN and agree with above examination and assessment and plan with the following addendum:        Focused PE:   General: NAD, slurred speech  Head: laceration to forehead  Eyes: PERRLA, EOMI  Cardiac: RRR, no murmurs, rubs or gallops  Resp: CTA, no wheezes, rales or ronchi  GI: Nondistended, nontender, no rebound or guarding  MSK: FROM, no tenderness.   Neuro: Alert and oriented, but with slurred speech  Ext: Non edematous, nontender.     Ddx: Head injury/ ro ICH/ cspine injury/ alcohol intoxication  Plan: CT head, cspine, cxr, pelvis, laceration repair, sobriety, d/c, tdap I have seen the patient with the resident and agree with above examination and assessment and plan with the following addendum:        Focused PE:   General: NAD, slurred speech  Head: laceration to forehead  Eyes: PERRLA, EOMI  Cardiac: RRR, no murmurs, rubs or gallops  Resp: CTA, no wheezes, rales or ronchi  GI: Nondistended, nontender, no rebound or guarding  MSK: FROM, no tenderness.   Neuro: Alert and oriented, but with slurred speech  Ext: Non edematous, nontender.     Ddx: Head injury/ ro ICH/ cspine injury/ alcohol intoxication  Plan: CT head, cspine, cxr, pelvis, laceration repair, sobriety, d/c, tdap

## 2023-08-14 NOTE — ED ADULT NURSE NOTE - NSFALLRISKINTERV_ED_ALL_ED
Called patient and LMOVM to call us back for test results. No name set up on . Assistance OOB with selected safe patient handling equipment if applicable/Assistance with ambulation/Communicate fall risk and risk factors to all staff, patient, and family/Monitor gait and stability/Provide visual cue: yellow wristband, yellow gown, etc/Reinforce activity limits and safety measures with patient and family/Call bell, personal items and telephone in reach/Instruct patient to call for assistance before getting out of bed/chair/stretcher/Non-slip footwear applied when patient is off stretcher/[place] to call system/Physically safe environment - no spills, clutter or unnecessary equipment/Purposeful Proactive Rounding/Room/bathroom lighting operational, light cord in reach

## 2023-08-14 NOTE — ED ADULT NURSE NOTE - OBJECTIVE STATEMENT
assumed pt care 2200 pt a&o x4 able to make needs known VSS reports headstrike after fall with ETOH this evening reports LOC lac to left forehead noted Dr. Ricks at bedside repairing lac at this time no other complaints. Pt in yellow gown belongings removed fall and safety precautions in place. RR even unlabored NAD  mentating well

## 2023-08-14 NOTE — ED ADULT NURSE NOTE - NSSUHOSCREENINGYN_ED_ALL_ED
Yes - the patient is able to be screened Clofazimine Counseling:  I discussed with the patient the risks of clofazimine including but not limited to skin and eye pigmentation, liver damage, nausea/vomiting, gastrointestinal bleeding and allergy.

## 2023-08-14 NOTE — ED PROVIDER NOTE - PATIENT PORTAL LINK FT
You can access the FollowMyHealth Patient Portal offered by [facility] by registering at the following website: [email or web address]. By joining [employer]’s FollowMyHealth portal, you will also be able to view your health information using other applications (apps) compatible with our system.

## 2023-08-14 NOTE — ED PROVIDER NOTE - CLINICAL SUMMARY MEDICAL DECISION MAKING FREE TEXT BOX
Ddx: Head injury/ ro ICH/ cspine injury/ alcohol intoxication  Plan: CT head, cspine, cxr, pelvis, laceration repair, sobriety, d/c, tdap

## 2023-08-14 NOTE — ED PROVIDER NOTE - NSFOLLOWUPINSTRUCTIONS_ED_ALL_ED_FT
Abuse of Alcohol    WHAT YOU NEED TO KNOW:    What is alcohol abuse? Alcohol abuse means you drink more than the recommended daily or weekly limits. You may be drinking alcohol regularly or drinking large amounts in a short period of time (binge drinking). You continue to drink even though it causes legal, work, or relationship problems.    What do I need to know about recommended alcohol limits? One drink is defined as 12 ounces (oz) of beer, 5 oz of wine, or 1.5 oz of liquor such as whiskey.    Men 21 to 64 years should limit alcohol to 2 drinks a day. Do not have more than 4 drinks in 1 day or more than 14 in 1 week.    All women, and men 65 or older should limit alcohol to 1 drink in a day. Do not have more than 3 drinks in 1 day or more than 7 in 1 week. Do not drink alcohol if you are pregnant.  What are the signs and symptoms of alcohol abuse?    Loss of interest in activities, work, and school    Hiding alcohol, or drinking in private    Depression, or guilt about drinking    Constant thoughts about alcohol    Drinking in the morning to relieve the effects of a hangover    Not being able to control the amount you drink    Restlessness, or erratic and violent behavior  What health problems can alcohol abuse cause?    Cancer in your liver, pancreas, stomach, colon, kidney, or breast    Stroke or a heart attack    Liver, kidney, or lung disease    Blackouts, memory loss, brain damage, or dementia    Diabetes, immune system problems, or thiamine (vitamin B1) deficiency    Problems for you and your baby if you drink while pregnant  How is alcohol abuse treated? Treatment can help you understand the reasons you abuse alcohol. Counselors and therapists provide you with support and help you find ways to cope instead of drinking. You may need inpatient treatment to provide a controlled environment. You may need outpatient treatment after your inpatient treatment is complete.    Detoxification (detox) is a program used to flush alcohol from your body. During detox, medicines are given to help prevent withdrawal symptoms when you stop drinking alcohol.    Brief intervention therapy helps you think about your alcohol use differently. A healthcare provider helps you set goals to decrease the amount of alcohol you drink. Therapy may continue after you leave the hospital.    Vitamin supplements such as B1 may be needed. Alcohol can make it hard for your body to absorb enough vitamin B1. You may be given vitamin B1 if you have low levels. It is also given to prevent brain damage from alcohol use.  What can I do to manage my alcohol use?    Work with healthcare providers on goals to drink less. This can help prevent health problems. For example, you may start by planning your weekly alcohol use. It will be easier to have fewer drinks if you plan ahead.    Have food when you drink alcohol. Food will prevent alcohol from getting into your system too quickly. Eat before you have your first alcohol drink.    Time your drinks carefully. Have no more than 1 drink in an hour. Have a liquid such as water, coffee, or a soft drink between alcohol drinks.    Do not drive if you have had alcohol. Plan ahead so you have a safe ride home. Make sure someone who has not been drinking can help you get home safely. Plan to use a taxi or other ride service, if needed.    Do not drink alcohol if you are taking medicine. Alcohol is dangerous when you combine it with certain medicines, such as acetaminophen or blood pressure medicine. Talk to your healthcare provider about all the medicines you currently take.  Where can I find support and more information?    Alcoholics Anonymous  Web Address: http://www.aa.org  Substance Abuse and Mental Health Services Administration  [address]  [place] [address]  Web Address: http://www.[facility].gov  Call your local emergency number (911 in the [place]), or have someone call if:    You have sudden chest pain or trouble breathing.    You want to harm yourself or others.    You have a seizure.  When should I seek care immediately?    You cannot stop vomiting or you vomit blood.    When should I call my doctor?    You have hallucinations (you see or hear things that are not real).    You have questions or concerns about your condition or care.  CARE AGREEMENT:    You have the right to help plan your care. Learn about your health condition and how it may be treated. Discuss treatment options with your healthcare providers to decide what care you want to receive. You always have the right to refuse treatment.

## 2023-08-14 NOTE — ED PROVIDER NOTE - PROGRESS NOTE DETAILS
TORINK - signout received from day team. Patient now clinically sober. Tolerating PO ambulating on his own. CT scans labs WNL. Ready for DC.

## 2023-08-14 NOTE — ED ADULT TRIAGE NOTE - CHIEF COMPLAINT QUOTE
patient intoxicated, admitted, awake and slurring words status post fall, laceration to left head, bleeding controlled, md called to bedside for priority

## 2023-08-15 VITALS
DIASTOLIC BLOOD PRESSURE: 84 MMHG | SYSTOLIC BLOOD PRESSURE: 137 MMHG | RESPIRATION RATE: 16 BRPM | HEART RATE: 95 BPM | TEMPERATURE: 98 F | OXYGEN SATURATION: 97 %

## 2023-08-15 RX ORDER — TETANUS TOXOID, REDUCED DIPHTHERIA TOXOID AND ACELLULAR PERTUSSIS VACCINE, ADSORBED 5; 2.5; 8; 8; 2.5 [IU]/.5ML; [IU]/.5ML; UG/.5ML; UG/.5ML; UG/.5ML
0.5 SUSPENSION INTRAMUSCULAR ONCE
Refills: 0 | Status: DISCONTINUED | OUTPATIENT
Start: 2023-08-15 | End: 2023-08-22

## 2023-10-12 ENCOUNTER — EMERGENCY (EMERGENCY)
Facility: HOSPITAL | Age: 63
LOS: 1 days | Discharge: HOME | End: 2023-10-12
Payer: SELF-PAY

## 2023-10-12 PROCEDURE — L9992: CPT

## 2023-10-23 ENCOUNTER — EMERGENCY (EMERGENCY)
Facility: HOSPITAL | Age: 63
LOS: 1 days | Discharge: HOME | End: 2023-10-23
Attending: EMERGENCY MEDICINE
Payer: COMMERCIAL

## 2023-10-23 VITALS
OXYGEN SATURATION: 99 % | SYSTOLIC BLOOD PRESSURE: 192 MMHG | TEMPERATURE: 98 F | WEIGHT: 220.02 LBS | RESPIRATION RATE: 16 BRPM | HEART RATE: 91 BPM | DIASTOLIC BLOOD PRESSURE: 102 MMHG | HEIGHT: 72 IN

## 2023-10-23 DIAGNOSIS — Z98.890 OTHER SPECIFIED POSTPROCEDURAL STATES: Chronic | ICD-10-CM

## 2023-10-23 PROCEDURE — L9995: CPT

## 2023-10-23 PROCEDURE — G0463: CPT

## 2023-10-23 NOTE — ED PROVIDER NOTE - OBJECTIVE STATEMENT
62yo M presenting to ED for suture and staple removal. Was seen in ED 10 days ago and had laceration repaired to scalp with 9 staples and 6 sutures. States area healed well, no complications or issues. Denies erythema, purulent drainage, headache, fever, chills.

## 2023-10-23 NOTE — ED PROVIDER NOTE - CLINICAL SUMMARY MEDICAL DECISION MAKING FREE TEXT BOX
62yo M presenting for staple and suture removal from scalp. laceration healing. no signs infection. Sutures and staples removed. pt educated on wound care. return precautions discussed. hypertensive in triage, pt with hx HTN, asymptomatic. advised to continue BP meds and f/u PMD

## 2023-10-23 NOTE — ED PROVIDER NOTE - PATIENT PORTAL LINK FT
You can access the FollowMyHealth Patient Portal offered by [facility] by registering at the following website: [email or web address]. By joining [employer]’s FollowMyHealth portal, you will also be able to view your health information using other applications (apps) compatible with our system.

## 2023-10-23 NOTE — ED PROVIDER NOTE - ATTENDING APP SHARED VISIT CONTRIBUTION OF CARE
63-year-old male presents for staple removal and suture removal.  Wound well-healed, no complications.

## 2023-10-23 NOTE — ED PROVIDER NOTE - PHYSICAL EXAMINATION
Gen: No acute distress, non toxic  Eyes: pink conjunctivae, EOMI, PERRL  Neuro: A&O x 3, sensorimotor intact without deficits   MSK: No spine or joint tenderness to palpation, Full ROM ext x 4  Skin: +Healing laceration with overlying scab to scalp with 9 staples and 6 sutures in place

## 2023-10-23 NOTE — ED PROVIDER NOTE - NS ED ROS FT
Gen: denies fever, chills, fatigue, weight loss  Skin: +Suture and Staple removal. denies rashes, laceration, bruising  HEENT: denies visual changes, ear pain, nasal congestion, throat pain  MSK: denies joint swelling/pain, back pain, neck pain  Neuro: denies headache, dizziness, weakness, numbness

## 2023-10-23 NOTE — ED PROVIDER NOTE - CARE PLAN
1 Principal Discharge DX:	Encounter for removal of staples  Secondary Diagnosis:	Encounter for removal of sutures

## 2023-12-05 NOTE — PATIENT PROFILE ADULT - DO YOU FEEL UNSAFE AT HOME, WORK, OR SCHOOL?
bilaterally, 1+ achilles reflex bilaterally, negative babinski bilaterally. no signs of hyper reflexia or absent reflex    Vascular: BLE: 2+ DP pulse, toes wwp w/ BCR<2s    Imaging:   Interpretation of imaging,   X-Ray LEFT Foot 2 vw (AP/Oblique) for foot pain   Findings: No signs of fracture or dislocations. The TMT joints are aligned, there are no signs of neoplastic disease, or chronic disease. The midtarsal and subtalar joints appear normal.   Impression:  Normal foot   Signature: Stefan Stone MD   , and   XR left tibia 2 view (AP/Lat) for lower leg pain   Findings: No signs of fracture or dislocations of the proximal or midshaft fibula or tibia. No signs of Proximal or distal tib/fib dislocation. No signs of neoplastic processes or acute injuries. The knee and ankle are not fully evaluated. Impression:  Normal tibia and fibula   Signature: Stefan Stone MD       X-Ray LEFT Ankle 3 vw (AP/Lateral/Oblique) for ankle pain   Findings: There are degenerative changes within the tibiotalar joint with joint space narrowing. Significant tibiotalar joint disease. I see no signs of fractures or dislocations through the ankle. No signs of neoplastic disease. Impression:  Tibiotalar joint arthritis,    Signature: Stefan Stone MD        Differential Diagnosis:   Tibiotalar arthritis  Ankle swelling    Treatment Plan:   4 This is an acute complicated injury  Treatment at this time: Prescription Drug Management        Return to work/work restrictions: none  Medrol Dosepak and NicoDerm patches given    I discussed tobacco use with the patient for 3 minutes. The patient currently uses cigarettes. I discussed tobacco is associated with low bone mineral density, delayed fracture union, rafael-implant bone loss, and implant failure. It also negatively affects blood flow and thus negatively affects wound healing.   I discussed how smokers have increased pain and lower overall patient satisfaction, and
no

## 2023-12-27 ENCOUNTER — EMERGENCY (EMERGENCY)
Facility: HOSPITAL | Age: 63
LOS: 1 days | Discharge: HOME | End: 2023-12-27
Attending: EMERGENCY MEDICINE
Payer: COMMERCIAL

## 2023-12-27 VITALS
TEMPERATURE: 98 F | DIASTOLIC BLOOD PRESSURE: 119 MMHG | RESPIRATION RATE: 20 BRPM | OXYGEN SATURATION: 96 % | HEART RATE: 79 BPM | SYSTOLIC BLOOD PRESSURE: 174 MMHG | WEIGHT: 220.02 LBS

## 2023-12-27 VITALS
RESPIRATION RATE: 19 BRPM | OXYGEN SATURATION: 95 % | HEART RATE: 72 BPM | TEMPERATURE: 98 F | DIASTOLIC BLOOD PRESSURE: 93 MMHG | SYSTOLIC BLOOD PRESSURE: 168 MMHG

## 2023-12-27 DIAGNOSIS — Z98.890 OTHER SPECIFIED POSTPROCEDURAL STATES: Chronic | ICD-10-CM

## 2023-12-27 LAB
ALBUMIN SERPL ELPH-MCNC: 4.3 G/DL — SIGNIFICANT CHANGE UP (ref 3.3–5.2)
ALBUMIN SERPL ELPH-MCNC: 4.3 G/DL — SIGNIFICANT CHANGE UP (ref 3.3–5.2)
ALP SERPL-CCNC: 89 U/L — SIGNIFICANT CHANGE UP (ref 40–120)
ALP SERPL-CCNC: 89 U/L — SIGNIFICANT CHANGE UP (ref 40–120)
ALT FLD-CCNC: 67 U/L — HIGH
ALT FLD-CCNC: 67 U/L — HIGH
AMPHET UR-MCNC: NEGATIVE — SIGNIFICANT CHANGE UP
AMPHET UR-MCNC: NEGATIVE — SIGNIFICANT CHANGE UP
ANION GAP SERPL CALC-SCNC: 12 MMOL/L — SIGNIFICANT CHANGE UP (ref 5–17)
ANION GAP SERPL CALC-SCNC: 12 MMOL/L — SIGNIFICANT CHANGE UP (ref 5–17)
APAP SERPL-MCNC: <3 UG/ML — LOW (ref 10–26)
APAP SERPL-MCNC: <3 UG/ML — LOW (ref 10–26)
AST SERPL-CCNC: 97 U/L — HIGH
AST SERPL-CCNC: 97 U/L — HIGH
BARBITURATES UR SCN-MCNC: NEGATIVE — SIGNIFICANT CHANGE UP
BARBITURATES UR SCN-MCNC: NEGATIVE — SIGNIFICANT CHANGE UP
BASOPHILS # BLD AUTO: 0.02 K/UL — SIGNIFICANT CHANGE UP (ref 0–0.2)
BASOPHILS # BLD AUTO: 0.02 K/UL — SIGNIFICANT CHANGE UP (ref 0–0.2)
BASOPHILS NFR BLD AUTO: 0.5 % — SIGNIFICANT CHANGE UP (ref 0–2)
BASOPHILS NFR BLD AUTO: 0.5 % — SIGNIFICANT CHANGE UP (ref 0–2)
BENZODIAZ UR-MCNC: NEGATIVE — SIGNIFICANT CHANGE UP
BENZODIAZ UR-MCNC: NEGATIVE — SIGNIFICANT CHANGE UP
BILIRUB SERPL-MCNC: 0.5 MG/DL — SIGNIFICANT CHANGE UP (ref 0.4–2)
BILIRUB SERPL-MCNC: 0.5 MG/DL — SIGNIFICANT CHANGE UP (ref 0.4–2)
BUN SERPL-MCNC: 6.4 MG/DL — LOW (ref 8–20)
BUN SERPL-MCNC: 6.4 MG/DL — LOW (ref 8–20)
CALCIUM SERPL-MCNC: 9.1 MG/DL — SIGNIFICANT CHANGE UP (ref 8.4–10.5)
CALCIUM SERPL-MCNC: 9.1 MG/DL — SIGNIFICANT CHANGE UP (ref 8.4–10.5)
CHLORIDE SERPL-SCNC: 101 MMOL/L — SIGNIFICANT CHANGE UP (ref 96–108)
CHLORIDE SERPL-SCNC: 101 MMOL/L — SIGNIFICANT CHANGE UP (ref 96–108)
CO2 SERPL-SCNC: 29 MMOL/L — SIGNIFICANT CHANGE UP (ref 22–29)
CO2 SERPL-SCNC: 29 MMOL/L — SIGNIFICANT CHANGE UP (ref 22–29)
COCAINE METAB.OTHER UR-MCNC: NEGATIVE — SIGNIFICANT CHANGE UP
COCAINE METAB.OTHER UR-MCNC: NEGATIVE — SIGNIFICANT CHANGE UP
CREAT SERPL-MCNC: 0.6 MG/DL — SIGNIFICANT CHANGE UP (ref 0.5–1.3)
CREAT SERPL-MCNC: 0.6 MG/DL — SIGNIFICANT CHANGE UP (ref 0.5–1.3)
EGFR: 109 ML/MIN/1.73M2 — SIGNIFICANT CHANGE UP
EGFR: 109 ML/MIN/1.73M2 — SIGNIFICANT CHANGE UP
EOSINOPHIL # BLD AUTO: 0.04 K/UL — SIGNIFICANT CHANGE UP (ref 0–0.5)
EOSINOPHIL # BLD AUTO: 0.04 K/UL — SIGNIFICANT CHANGE UP (ref 0–0.5)
EOSINOPHIL NFR BLD AUTO: 0.9 % — SIGNIFICANT CHANGE UP (ref 0–6)
EOSINOPHIL NFR BLD AUTO: 0.9 % — SIGNIFICANT CHANGE UP (ref 0–6)
ETHANOL SERPL-MCNC: 309 MG/DL — HIGH (ref 0–9)
ETHANOL SERPL-MCNC: 309 MG/DL — HIGH (ref 0–9)
FLUAV AG NPH QL: SIGNIFICANT CHANGE UP
FLUAV AG NPH QL: SIGNIFICANT CHANGE UP
FLUBV AG NPH QL: SIGNIFICANT CHANGE UP
FLUBV AG NPH QL: SIGNIFICANT CHANGE UP
GLUCOSE SERPL-MCNC: 101 MG/DL — HIGH (ref 70–99)
GLUCOSE SERPL-MCNC: 101 MG/DL — HIGH (ref 70–99)
HCT VFR BLD CALC: 46 % — SIGNIFICANT CHANGE UP (ref 39–50)
HCT VFR BLD CALC: 46 % — SIGNIFICANT CHANGE UP (ref 39–50)
HGB BLD-MCNC: 15.2 G/DL — SIGNIFICANT CHANGE UP (ref 13–17)
HGB BLD-MCNC: 15.2 G/DL — SIGNIFICANT CHANGE UP (ref 13–17)
IMM GRANULOCYTES NFR BLD AUTO: 0.2 % — SIGNIFICANT CHANGE UP (ref 0–0.9)
IMM GRANULOCYTES NFR BLD AUTO: 0.2 % — SIGNIFICANT CHANGE UP (ref 0–0.9)
LYMPHOCYTES # BLD AUTO: 2.03 K/UL — SIGNIFICANT CHANGE UP (ref 1–3.3)
LYMPHOCYTES # BLD AUTO: 2.03 K/UL — SIGNIFICANT CHANGE UP (ref 1–3.3)
LYMPHOCYTES # BLD AUTO: 47.3 % — HIGH (ref 13–44)
LYMPHOCYTES # BLD AUTO: 47.3 % — HIGH (ref 13–44)
MCHC RBC-ENTMCNC: 29.8 PG — SIGNIFICANT CHANGE UP (ref 27–34)
MCHC RBC-ENTMCNC: 29.8 PG — SIGNIFICANT CHANGE UP (ref 27–34)
MCHC RBC-ENTMCNC: 33 GM/DL — SIGNIFICANT CHANGE UP (ref 32–36)
MCHC RBC-ENTMCNC: 33 GM/DL — SIGNIFICANT CHANGE UP (ref 32–36)
MCV RBC AUTO: 90.2 FL — SIGNIFICANT CHANGE UP (ref 80–100)
MCV RBC AUTO: 90.2 FL — SIGNIFICANT CHANGE UP (ref 80–100)
METHADONE UR-MCNC: NEGATIVE — SIGNIFICANT CHANGE UP
METHADONE UR-MCNC: NEGATIVE — SIGNIFICANT CHANGE UP
MONOCYTES # BLD AUTO: 0.42 K/UL — SIGNIFICANT CHANGE UP (ref 0–0.9)
MONOCYTES # BLD AUTO: 0.42 K/UL — SIGNIFICANT CHANGE UP (ref 0–0.9)
MONOCYTES NFR BLD AUTO: 9.8 % — SIGNIFICANT CHANGE UP (ref 2–14)
MONOCYTES NFR BLD AUTO: 9.8 % — SIGNIFICANT CHANGE UP (ref 2–14)
NEUTROPHILS # BLD AUTO: 1.77 K/UL — LOW (ref 1.8–7.4)
NEUTROPHILS # BLD AUTO: 1.77 K/UL — LOW (ref 1.8–7.4)
NEUTROPHILS NFR BLD AUTO: 41.3 % — LOW (ref 43–77)
NEUTROPHILS NFR BLD AUTO: 41.3 % — LOW (ref 43–77)
OPIATES UR-MCNC: NEGATIVE — SIGNIFICANT CHANGE UP
OPIATES UR-MCNC: NEGATIVE — SIGNIFICANT CHANGE UP
PCP SPEC-MCNC: SIGNIFICANT CHANGE UP
PCP SPEC-MCNC: SIGNIFICANT CHANGE UP
PCP UR-MCNC: NEGATIVE — SIGNIFICANT CHANGE UP
PCP UR-MCNC: NEGATIVE — SIGNIFICANT CHANGE UP
PLATELET # BLD AUTO: 166 K/UL — SIGNIFICANT CHANGE UP (ref 150–400)
PLATELET # BLD AUTO: 166 K/UL — SIGNIFICANT CHANGE UP (ref 150–400)
POTASSIUM SERPL-MCNC: 3.5 MMOL/L — SIGNIFICANT CHANGE UP (ref 3.5–5.3)
POTASSIUM SERPL-MCNC: 3.5 MMOL/L — SIGNIFICANT CHANGE UP (ref 3.5–5.3)
POTASSIUM SERPL-SCNC: 3.5 MMOL/L — SIGNIFICANT CHANGE UP (ref 3.5–5.3)
POTASSIUM SERPL-SCNC: 3.5 MMOL/L — SIGNIFICANT CHANGE UP (ref 3.5–5.3)
PROT SERPL-MCNC: 7.9 G/DL — SIGNIFICANT CHANGE UP (ref 6.6–8.7)
PROT SERPL-MCNC: 7.9 G/DL — SIGNIFICANT CHANGE UP (ref 6.6–8.7)
RBC # BLD: 5.1 M/UL — SIGNIFICANT CHANGE UP (ref 4.2–5.8)
RBC # BLD: 5.1 M/UL — SIGNIFICANT CHANGE UP (ref 4.2–5.8)
RBC # FLD: 13.4 % — SIGNIFICANT CHANGE UP (ref 10.3–14.5)
RBC # FLD: 13.4 % — SIGNIFICANT CHANGE UP (ref 10.3–14.5)
RSV RNA NPH QL NAA+NON-PROBE: SIGNIFICANT CHANGE UP
RSV RNA NPH QL NAA+NON-PROBE: SIGNIFICANT CHANGE UP
SALICYLATES SERPL-MCNC: <0.6 MG/DL — LOW (ref 10–20)
SALICYLATES SERPL-MCNC: <0.6 MG/DL — LOW (ref 10–20)
SARS-COV-2 RNA SPEC QL NAA+PROBE: DETECTED
SARS-COV-2 RNA SPEC QL NAA+PROBE: DETECTED
SODIUM SERPL-SCNC: 142 MMOL/L — SIGNIFICANT CHANGE UP (ref 135–145)
SODIUM SERPL-SCNC: 142 MMOL/L — SIGNIFICANT CHANGE UP (ref 135–145)
THC UR QL: NEGATIVE — SIGNIFICANT CHANGE UP
THC UR QL: NEGATIVE — SIGNIFICANT CHANGE UP
WBC # BLD: 4.29 K/UL — SIGNIFICANT CHANGE UP (ref 3.8–10.5)
WBC # BLD: 4.29 K/UL — SIGNIFICANT CHANGE UP (ref 3.8–10.5)
WBC # FLD AUTO: 4.29 K/UL — SIGNIFICANT CHANGE UP (ref 3.8–10.5)
WBC # FLD AUTO: 4.29 K/UL — SIGNIFICANT CHANGE UP (ref 3.8–10.5)

## 2023-12-27 PROCEDURE — 80307 DRUG TEST PRSMV CHEM ANLYZR: CPT

## 2023-12-27 PROCEDURE — 99284 EMERGENCY DEPT VISIT MOD MDM: CPT

## 2023-12-27 PROCEDURE — 80053 COMPREHEN METABOLIC PANEL: CPT

## 2023-12-27 PROCEDURE — 85025 COMPLETE CBC W/AUTO DIFF WBC: CPT

## 2023-12-27 PROCEDURE — 87637 SARSCOV2&INF A&B&RSV AMP PRB: CPT

## 2023-12-27 PROCEDURE — 36415 COLL VENOUS BLD VENIPUNCTURE: CPT

## 2023-12-27 PROCEDURE — 99285 EMERGENCY DEPT VISIT HI MDM: CPT

## 2023-12-27 PROCEDURE — 82962 GLUCOSE BLOOD TEST: CPT

## 2023-12-27 NOTE — ED PROVIDER NOTE - OBJECTIVE STATEMENT
63-year-old male; PMH significant for HTN, alcohol abuse; now presenting with intoxication from group home.  Patient states he only had 2-3 beers and does not know why the  told him to come to the hospital.  Patient states he does not feel drunk.  Denies any chest pain, shortness of breath, palpitations.  Denies any headache.  Denies nausea or vomiting.  Denies focal weakness.  Denies numbness or tingling.

## 2023-12-27 NOTE — ED PROVIDER NOTE - PATIENT PORTAL LINK FT
Discontinue Metoprolol   Encourage Hydration  And schedule follow up appointment this week    You can access the FollowMyHealth Patient Portal offered by [facility] by registering at the following website: [email or web address]. By joining [employer]’s FollowMyHealth portal, you will also be able to view your health information using other applications (apps) compatible with our system. You can access the FollowMyHealth Patient Portal offered by [facility] by registering at the following website: [email or web address]. By joining [employer]’s FollowMyHealth portal, you will also be able to view your health information using other applications (apps) compatible with our system.

## 2023-12-27 NOTE — ED PROVIDER NOTE - CLINICAL SUMMARY MEDICAL DECISION MAKING FREE TEXT BOX
63-year-old male; PMH significant for HTN, alcohol abuse; now presenting with intoxication from group home.  Patient states he only had 2-3 beers and does not know why the  told him to come to the hospital.  will observe to sobriety.

## 2023-12-27 NOTE — ED ADULT NURSE NOTE - CHIEF COMPLAINT QUOTE
Pt BIBA for alcohol intoxication.  EMS was called by pt's group home.  Pt denies drug use.  Admits to drinking alcohol.  A&Ox3.  Denies pain.  Pt placed in yellow gown.  Belongings collected, labeled and secured.

## 2023-12-27 NOTE — ED ADULT NURSE NOTE - OBJECTIVE STATEMENT
Pt to ED from group home s/p +ETOH abuse. Pt states he drank "a few beers" and "the lady from the home sent me here." Pt unsure as to why he was sent here for drinking a few beers. Pt A&Ox4 in NAD @ this time. Pt ambulatory to the bathroom w/o assistance. RR even & unlabored. Pt denies any pain or complaints @ this time. Denies SI/HI.

## 2023-12-27 NOTE — ED ADULT NURSE NOTE - NSFALLUNIVINTERV_ED_ALL_ED
Bed/Stretcher in lowest position, wheels locked, appropriate side rails in place/Call bell, personal items and telephone in reach/Instruct patient to call for assistance before getting out of bed/chair/stretcher/Non-slip footwear applied when patient is off stretcher/[place] to call system/Physically safe environment - no spills, clutter or unnecessary equipment/Purposeful proactive rounding/Room/bathroom lighting operational, light cord in reach Bed/Stretcher in lowest position, wheels locked, appropriate side rails in place/Call bell, personal items and telephone in reach/Instruct patient to call for assistance before getting out of bed/chair/stretcher/Non-slip footwear applied when patient is off stretcher/[place] to call system/Physically safe environment - no spills, clutter or unnecessary equipment/Purposeful proactive rounding/Room/bathroom lighting operational, light cord in reach

## 2023-12-27 NOTE — ED ADULT TRIAGE NOTE - CHIEF COMPLAINT QUOTE
Pt BIBA for alcohol intoxication.  EMS was called by pt's group home.  Pt denies drug use.  Admits to drinking alcohol.  A&Ox3.  Denies pain. Pt BIBA for alcohol intoxication.  EMS was called by pt's group home.  Pt denies drug use.  Admits to drinking alcohol.  A&Ox3.  Denies pain.  Pt placed in yellow gown.  Belongings collected, labeled and secured.

## 2024-01-16 NOTE — ED ADULT NURSE NOTE - RESPIRATORY ASSESSMENT
Patient repeated back and voice a understanding of orders.  Patient was previously taking Myfortic.  Patient to restart Myfortic 360mg BID.   ----- Message from Darrin Verma MD sent at 1/16/2024 11:54 AM CST -----  Let start  bid if th next bk pcr is still negative   WDL

## 2024-02-26 ENCOUNTER — EMERGENCY (EMERGENCY)
Facility: HOSPITAL | Age: 64
LOS: 1 days | Discharge: HOME | End: 2024-02-26
Attending: STUDENT IN AN ORGANIZED HEALTH CARE EDUCATION/TRAINING PROGRAM
Payer: COMMERCIAL

## 2024-02-26 VITALS
TEMPERATURE: 98 F | RESPIRATION RATE: 17 BRPM | DIASTOLIC BLOOD PRESSURE: 92 MMHG | OXYGEN SATURATION: 98 % | HEART RATE: 84 BPM | SYSTOLIC BLOOD PRESSURE: 160 MMHG

## 2024-02-26 DIAGNOSIS — Z98.890 OTHER SPECIFIED POSTPROCEDURAL STATES: Chronic | ICD-10-CM

## 2024-02-26 PROCEDURE — 99284 EMERGENCY DEPT VISIT MOD MDM: CPT

## 2024-02-26 PROCEDURE — 70450 CT HEAD/BRAIN W/O DYE: CPT | Mod: 26,MC

## 2024-02-26 PROCEDURE — 70486 CT MAXILLOFACIAL W/O DYE: CPT | Mod: 26,MC

## 2024-02-26 PROCEDURE — 72125 CT NECK SPINE W/O DYE: CPT | Mod: 26,MC

## 2024-02-26 RX ORDER — TETANUS TOXOID, REDUCED DIPHTHERIA TOXOID AND ACELLULAR PERTUSSIS VACCINE, ADSORBED 5; 2.5; 8; 8; 2.5 [IU]/.5ML; [IU]/.5ML; UG/.5ML; UG/.5ML; UG/.5ML
0.5 SUSPENSION INTRAMUSCULAR ONCE
Refills: 0 | Status: DISCONTINUED | OUTPATIENT
Start: 2024-02-26 | End: 2024-03-05

## 2024-02-26 NOTE — ED PROVIDER NOTE - OBJECTIVE STATEMENT
63-year-old male with past medical history hypertension, alcohol abuse presents after a fall while intoxicated.  Patient is awake, alert, but unable to tell me the details of his fall.  He arrives with abrasions to his right face, dried blood on his hands.  He admits to drinking today.  He denies any other injuries or any pain.  He cannot recall his last tetanus shot.  Prior to CT ordered.

## 2024-02-26 NOTE — ED ADULT NURSE NOTE - OBJECTIVE STATEMENT
biba, status post fall, patient reports drinking alcohol and falling onto face, abrasions noted to face, face is covered in dry blood. Denies nausea, able to follow commands and move all extremities. Placed in yellow gown. Patient refuses to answer further questions.

## 2024-02-26 NOTE — ED ADULT TRIAGE NOTE - CHIEF COMPLAINT QUOTE
[place] " [place] and fell at the station, people there called 911" . pt a&ox1, slurred speech, admits to etoh use "drinking since noon". + lac to right cheek, face covered in dry blood. md at bedside. placed in yellow gown, unk LOC/anticoag use.

## 2024-02-26 NOTE — ED PROVIDER NOTE - CLINICAL SUMMARY MEDICAL DECISION MAKING FREE TEXT BOX
63-year-old man presents for fall while intoxicated.  Patient with abrasions to his left face, no bony deficit, no other injuries.  Will update tetanus, CT head, C-spine, max face, Accu-Chek and observe for sobriety.

## 2024-02-26 NOTE — ED ADULT NURSE NOTE - NSFALLRISKINTERV_ED_ALL_ED
Assistance OOB with selected safe patient handling equipment if applicable/Assistance with ambulation/Communicate fall risk and risk factors to all staff, patient, and family/Monitor gait and stability/Monitor for mental status changes and reorient to person, place, and time, as needed/Provide visual cue: yellow wristband, yellow gown, etc/Reinforce activity limits and safety measures with patient and family/Toileting schedule using arm’s reach rule for commode and bathroom/Use of alarms - bed, stretcher, chair and/or video monitoring/Call bell, personal items and telephone in reach/Instruct patient to call for assistance before getting out of bed/chair/stretcher/Non-slip footwear applied when patient is off stretcher/[place] to call system/Physically safe environment - no spills, clutter or unnecessary equipment/Purposeful Proactive Rounding/Room/bathroom lighting operational, light cord in reach

## 2024-02-26 NOTE — ED PROVIDER NOTE - PHYSICAL EXAMINATION
Gen: Well appearing in NAD  Head: Large abrasions to left face, dried epistaxis, no septal hematoma. EOMI, no roy sign, no raccoon eyes.  Neck: trachea midline  Resp:  No distress  Ext: no deformities  Neuro:  A&O appears non focal  Skin: Facial abrasions as described, otherwise warm and dry as visualized  Psych:  Normal affect and mood

## 2024-02-26 NOTE — ED PROVIDER NOTE - NSFOLLOWUPINSTRUCTIONS_ED_ALL_ED_FT
Head Injury, Adult       There are many types of head injuries. Head injuries can be as minor as a small bump, or they can be a serious medical issue. More severe head injuries include:    A jarring injury to the brain (concussion).  A bruise (contusion) of the brain. This means there is bleeding in the brain that can cause swelling.  A cracked skull (skull fracture).  Bleeding in the brain that collects, clots, and forms a bump (hematoma).    After a head injury, most problems occur within the first 24 hours, but side effects may occur up to 7–10 days after the injury. It is important to watch your condition for any changes. You may need to be observed in the emergency department or urgent care, or you may be admitted to the hospital.    What are the causes?  There are many possible causes of a head injury. Serious head injuries may be caused by car accidents, bicycle or motorcycle accidents, sports injuries, falls, or being struck by an object.    What are the symptoms?  Symptoms of a head injury include a contusion, bump, or bleeding at the site of the injury. Other physical symptoms may include:    Headache.  Nausea or vomiting.  Dizziness.  Blurred or double vision.  Being uncomfortable around bright lights or loud noises.  Seizures.  Feeling tired.  Trouble being awakened.  Loss of consciousness.    Mental or emotional symptoms may include:    Irritability.  Confusion and memory problems.  Poor attention and concentration.  Changes in eating or sleeping habits.  Anxiety or depression.    How is this diagnosed?  This condition can usually be diagnosed based on your symptoms, a description of the injury, and a physical exam. You may also have imaging tests done, such as a CT scan or an MRI.    How is this treated?  Treatment for this condition depends on the severity and type of injury you have. The main goal of treatment is to prevent complications and allow the brain time to heal.        Mild head injury    If you have a mild head injury, you may be sent home, and treatment may include:    Observation. A responsible adult should stay with you for 24 hours after your injury and check on you often.  Physical rest.  Brain rest.  Pain medicines.        Severe head injury    If you have a severe head injury, treatment may include:    Close observation. This includes hospitalization with the following care:    Frequent physical exams.  Frequent checks of how your brain and nervous system are working (neurological status).  Checking your blood pressure and oxygen levels.  Medicines to relieve pain, prevent seizures, and decrease brain swelling.  Airway protection and breathing support. This may include using a ventilator.  Treatments that monitor and manage swelling inside the brain.  Brain surgery. This may be needed to:    Remove a collection of blood or blood clots.  Stop the bleeding.  Remove a part of the skull to allow room for the brain to swell.    Follow these instructions at home:      Activity    Rest and avoid activities that are physically hard or tiring.  Make sure you get enough sleep.  Let your brain rest by limiting activities that require a lot of thought or attention, such as:    Watching TV.  Playing memory games and puzzles.  Job-related work or homework.  Working on the computer, using social media, and texting.  Avoid activities that could cause another head injury, such as playing sports, until your health care provider approves. Having another head injury, especially before the first one has healed, can be dangerous.  Ask your health care provider when it is safe for you to return to your regular activities, including work or school. Ask your health care provider for a step-by-step plan for gradually returning to activities.  Ask your health care provider when you can drive, ride a bicycle, or use heavy machinery. Your ability to react may be slower after a brain injury. Do not do these activities if you are dizzy.        Lifestyle     Do not drink alcohol until your health care provider approves. Do not use drugs. Alcohol and certain drugs may slow your recovery and can put you at risk of further injury.  If it is harder than usual to remember things, write them down.  If you are easily distracted, try to do one thing at a time.  Talk with family members or close friends when making important decisions.  Tell your friends, family, a trusted colleague, and  about your injury, symptoms, and restrictions. Have them watch for any new or worsening problems.        General instructions    Take over-the-counter and prescription medicines only as told by your health care provider.  Have someone stay with you for 24 hours after your head injury. This person should watch you for any changes in your symptoms and be ready to seek medical help.  Keep all follow-up visits as told by your health care provider. This is important.    How is this prevented?  Work on improving your balance and strength to avoid falls.  Wear a seat belt when you are in a moving vehicle.  Wear a helmet when riding a bicycle, skiing, or doing any other sport or activity that has a risk of injury.  If you drink alcohol:    Limit how much you use to:    0–1 drink a day for nonpregnant women.  0–2 drinks a day for men.  Be aware of how much alcohol is in your drink. In the [place] one drink equals one 12 oz bottle of beer (355 mL), one 5 oz glass of wine (148 mL), or one 1½ oz glass of hard liquor (44 mL).  Take safety measures in your home, such as:    Removing clutter and tripping hazards from floors and stairways.  Using grab bars in bathrooms and handrails by stairs.  Placing non-slip mats on floors and in bathtubs.  Improving lighting in dim areas.    Where to find more information  Centers for Disease Control and Prevention: www.cdc.gov    Get help right away if:  You have:    A severe headache that is not helped by medicine.  Trouble walking or weakness in your arms and legs.  Clear or bloody fluid coming from your nose or ears.  Changes in your vision.  A seizure.  Increased confusion or irritability.  Your symptoms get worse.  You are sleepier than normal and have trouble staying awake.  You lose your balance.  Your pupils change size.  Your speech is slurred.  Your dizziness gets worse.  You vomit.    These symptoms may represent a serious problem that is an emergency. Do not wait to see if the symptoms will go away. Get medical help right away. Call your local emergency services (911 in the [place] Do not drive yourself to the hospital.    Summary  Head injuries can be minor, or they can be a serious medical issue requiring immediate attention.  Treatment for this condition depends on the severity and type of injury you have.  Have someone stay with you for 24 hours after your injury and check on you often.  Ask your health care provider when it is safe for you to return to your regular activities, including work or school.  Head injury prevention includes wearing a seat belt in a motor vehicle, using a helmet on a bicycle, limiting alcohol use, and taking safety measures in your home.    Alcohol Intoxication    Alcohol intoxication occurs when a person no longer thinks clearly or functions well (becomes impaired) after drinking alcohol. Intoxication can occur with just one drink. The legal definition of alcohol intoxication depends on the amount of alcohol in the blood (blood alcohol concentration, HAKEEM). HAKEEM of 80–100 mg/dL or higher is commonly considered legally intoxicated. The level of impairment depends on:    The amount of alcohol the person had.  The person's age, gender, and weight.  How often the person drinks.  Whether the person has other medical conditions, such as diabetes, seizures, or a heart condition.    Alcohol intoxication can range from mild to severe. The condition can be dangerous, especially if the person:    Also took certain drugs or prescription medicines.  Drinks a large amount of alcohol in a short period of time (binge drinks).    For women, binge drinking is having four or more drinks at one time.  For men, binge drinking is having five or more drinks at one time.    If you or anyone around you appears intoxicated, speak up and act.    What are the causes?  This condition is caused by drinking alcohol.    What increases the risk?  The following factors may make you more likely to develop this condition:    Peer pressure in young adults.  Difficulty managing stress.  History of drug or alcohol abuse.  Combining alcohol with drugs.  Family history of drug or alcohol abuse.  Low body weight.  Binge drinking.    What are the signs or symptoms?  Symptoms of alcohol intoxication can vary from person to person. Symptoms can be mild, moderate, or severe.    Symptoms of mild alcohol intoxication may include:    Feeling relaxed or sleepy.  Having mild difficulty with coordination, speech, memory, or attention.    Symptoms of moderate alcohol intoxication may include:    Extreme emotions, like anger or sadness.  Moderate difficulty with coordination, speech, memory, or attention.    Symptoms of severe alcohol intoxication may include:    Severe difficulty with coordination, speech, memory, or attention.  Passing out.  Vomiting.  Confusion.  Slow breathing.  Coma.    Intoxication can change quickly from mild to severe. It can cause coma or death, especially in people who are not exposed to alcohol often.    How is this diagnosed?  Your health care provider will ask you how much alcohol you drank and what kind you had. Intoxication may also be diagnosed based on:    Your symptoms and medical history.  A physical exam.  A blood test that measures HAKEEM.  A smell of alcohol on your breath.    How is this treated?  Treatment for alcohol intoxication may include:    Being monitored in an emergency department, hospital, or treatment center until your HAKEEM comes down and it is safe for you to go home.  IV fluids to prevent or treat loss of fluid in the body (dehydration).  Medicine to treat nausea or vomiting or to get rid of alcohol in the body.  Counseling (brief intervention) about the dangers of using alcohol.  Treatment for substance use disorder.  Oxygen therapy or a breathing machine (ventilator).    Long-term (chronic) exposure to alcohol can have long-term effects on your brain, heart, and gastrointestinal system. These effects can be serious and may also require treatment.    Follow these instructions at home:         Eating and drinking     Do not drink alcohol if:    Your health care provider tells you not to drink.  You are pregnant, may be pregnant, or are planning to become pregnant.  You are under the legal drinking age (21 years old in the [place]  You are taking medicines that should not be taken with alcohol.  You have a medical condition, and alcohol makes it worse.  You need to drive or perform activities that require you to be alert.  You have substance use disorder.  Ask your health care provider if alcohol is safe for you. If your health care provider allows you to drink alcohol, limit how much you have. You may drink:    0–1 drink a day for women.   0–2 drinks a day for men.     Be aware of how much alcohol is in your drink. In the [place] one drink equals one 12 oz bottle of beer (355 mL), one 5 oz glass of wine (148 mL), or one 1½ oz shot of hard liquor (44 mL).  Avoid drinking alcohol on an empty stomach.  Stay hydrated. Drink enough fluid to keep your urine pale yellow. Avoid caffeine because it can dehydrate you.  Avoid drinking more than one drink per hour.  When having multiple drinks, drink water or a non-alcoholic beverage between alcoholic drinks.        General instructions    Take over-the-counter and prescription medicines only as told by your health care provider.  Do not drive after drinking any amount of alcohol. Plan for a designated  or another way to go home.  Have someone responsible stay with you while you are intoxicated. You should not be left alone.  Keep all follow-up visits as told by your health care provider. This is important.    Contact a health care provider if:  You do not feel better after a few days.  You have problems at work, at school, or at home due to drinking.    Get help right away if:  You have any of the following:    Moderate to severe trouble with coordination, speech, memory, or attention.  Trouble staying awake.  Severe confusion.  A seizure.  Light-headedness.  Fainting.  Vomiting bright red blood or material that looks like coffee grounds.  Bloody stool (feces). The blood may make your stool bright red, black, or tarry. It may also smell bad.  Shakiness when trying to stop drinking.  Thoughts about hurting yourself or others.    If you ever feel like you may hurt yourself or others, or have thoughts about taking your own life, get help right away. You can go to your nearest emergency department or call:    Your local emergency services (911 in the [place]  A suicide crisis helpline, such as the National Suicide Prevention Lifeline at 1-347.794.2929. This is open 24 hours a day.    Summary  Alcohol intoxication occurs when a person no longer thinks clearly or functions well after drinking alcohol.  If your health care provider says that alcohol is safe for you, limit alcohol intake to no more than 1 drink a day for women (no drinks if you are pregnant) and 2 drinks a day for men. One drink equals 12 oz of beer, 5 oz of wine, or 1½ oz of hard liquor.  Contact your health care provider if drinking has caused you problems at work, school, or home.  Get help right away if you have thoughts about hurting yourself or others.    ADDITIONAL NOTES AND INSTRUCTIONS    Please follow up with your Primary MD in 24-48 hr.  Seek immediate medical care for any new/worsening signs or symptoms.

## 2024-02-26 NOTE — ED PROVIDER NOTE - CARE PLAN
1 Principal Discharge DX:	Facial abrasion  Secondary Diagnosis:	Alcohol intoxication, uncomplicated

## 2024-02-26 NOTE — ED ADULT NURSE NOTE - PAIN RATING/NUMBER SCALE (0-10): ACTIVITY
SEVERITY:- ABNORMAL ECG -

SINUS RHYTHM

VENTRICULAR PREMATURE COMPLEX

NONSPECIFIC IVCD WITH LAD

PROBABLE INFERIOR INFARCT, OLD

CONSIDER ANTERIOR INFARCT

:

Confirmed by: Melquiades Baird MD 04-Apr-2019 07:55:43
6 (moderate pain)

## 2024-02-26 NOTE — ED PROVIDER NOTE - PATIENT PORTAL LINK FT
You can access the FollowMyHealth Patient Portal offered by [facility] by registering at the following website: [email or web address]. By joining [employer]’s FollowMyHealth portal, you will also be able to view your health information using other applications (apps) compatible with our system.

## 2024-02-26 NOTE — ED PROVIDER NOTE - NS ED ROS FT
Const: Denies fever, chills  HEENT: Denies blurry vision, sore throat  Neck: Denies neck pain/stiffness  Resp: Denies coughing, SOB  Cardiovascular: Denies CP, palpitations, LE edema  GI: Denies nausea, vomiting, abdominal pain, diarrhea, constipation, blood in stool  : Denies urinary frequency/urgency/dysuria, hematuria  MSK: Denies back pain  Neuro: Denies HA, dizziness, numbness, weakness  Skin: + abrasions. Denies rashes.

## 2024-02-27 VITALS
RESPIRATION RATE: 18 BRPM | TEMPERATURE: 99 F | OXYGEN SATURATION: 99 % | SYSTOLIC BLOOD PRESSURE: 156 MMHG | DIASTOLIC BLOOD PRESSURE: 86 MMHG | HEART RATE: 97 BPM

## 2024-02-27 PROCEDURE — 70486 CT MAXILLOFACIAL W/O DYE: CPT | Mod: MC

## 2024-02-27 PROCEDURE — 70450 CT HEAD/BRAIN W/O DYE: CPT | Mod: MC

## 2024-02-27 PROCEDURE — 82962 GLUCOSE BLOOD TEST: CPT

## 2024-02-27 PROCEDURE — 72125 CT NECK SPINE W/O DYE: CPT | Mod: MC

## 2024-02-27 PROCEDURE — 99285 EMERGENCY DEPT VISIT HI MDM: CPT | Mod: 25

## 2024-02-27 RX ADMIN — Medication 100 MILLIGRAM(S): at 05:52

## 2024-02-27 RX ADMIN — Medication 50 MILLIGRAM(S): at 02:49

## 2024-04-08 ENCOUNTER — EMERGENCY (EMERGENCY)
Facility: HOSPITAL | Age: 64
LOS: 1 days | Discharge: HOME | End: 2024-04-08
Attending: EMERGENCY MEDICINE
Payer: COMMERCIAL

## 2024-04-08 VITALS
TEMPERATURE: 98 F | DIASTOLIC BLOOD PRESSURE: 101 MMHG | RESPIRATION RATE: 16 BRPM | SYSTOLIC BLOOD PRESSURE: 152 MMHG | OXYGEN SATURATION: 98 % | HEART RATE: 96 BPM

## 2024-04-08 VITALS
SYSTOLIC BLOOD PRESSURE: 129 MMHG | DIASTOLIC BLOOD PRESSURE: 87 MMHG | RESPIRATION RATE: 16 BRPM | HEIGHT: 71 IN | TEMPERATURE: 98 F | OXYGEN SATURATION: 96 % | WEIGHT: 175.05 LBS | HEART RATE: 95 BPM

## 2024-04-08 DIAGNOSIS — Z98.890 OTHER SPECIFIED POSTPROCEDURAL STATES: Chronic | ICD-10-CM

## 2024-04-08 PROCEDURE — 99283 EMERGENCY DEPT VISIT LOW MDM: CPT

## 2024-04-08 PROCEDURE — 99285 EMERGENCY DEPT VISIT HI MDM: CPT

## 2024-04-08 NOTE — ED ADULT NURSE NOTE - OBJECTIVE STATEMENT
Pt is a 63YOM who came in for intoxication, pt states he lives at a shelter and was sent into the hospital for being intoxicated, pt states he was recently diagnosed with a brain tumor and started to drink again, states he drank "quite a bit" and his last time drinking was last monday, pt denies any falls, pain, discomfort, pt is A&O4 at this time,

## 2024-04-08 NOTE — ED PROVIDER NOTE - PATIENT PORTAL LINK FT
You can access the FollowMyHealth Patient Portal offered by [facility] by registering at the following website: [email or web address]. By joining [email or web address]’s FollowMyHealth portal, you will also be able to view your health information using other applications (apps) compatible with our system.

## 2024-04-08 NOTE — ED ADULT NURSE NOTE - NSFALLRISKINTERV_ED_ALL_ED
Assistance OOB with selected safe patient handling equipment if applicable/Assistance with ambulation/Communicate fall risk and risk factors to all staff, patient, and family/Monitor gait and stability/Monitor for mental status changes and reorient to person, place, and time, as needed/Provide visual cue: yellow wristband, yellow gown, etc/Reinforce activity limits and safety measures with patient and family/Toileting schedule using arm’s reach rule for commode and bathroom/Use of alarms - bed, stretcher, chair and/or video monitoring/Call bell, personal items and telephone in reach/Instruct patient to call for assistance before getting out of bed/chair/stretcher/Non-slip footwear applied when patient is off stretcher/[place] to call system/Physically safe environment - no spills, clutter or unnecessary equipment/Purposeful Proactive Rounding/Room/bathroom lighting operational, light cord in reach

## 2024-04-08 NOTE — ED ADULT TRIAGE NOTE - CHIEF COMPLAINT QUOTE
Patient c/o ETOH and weakness in his legs. States that he doesn't feel that well. Changed in yellow gown and belongings secured.

## 2024-04-08 NOTE — ED PROVIDER NOTE - CROS ED ENMT ALL NEG
Pt care received at this time. Pt is sleeping. No distress noted. Sitter at bedside.       Kathlean Brunner, RN  12/21/20 [id number] negative...

## 2024-04-08 NOTE — ED PROVIDER NOTE - OBJECTIVE STATEMENT
63-year-old male past no history of alcohol abuse presents with intoxication.  Patient reports that he was trying heavily today, was walking and felt his feet go numb and fell.  He denies hitting his head no loss of conscious.  He states that this was only momentary and he currently feels well and has no weakness or numbness in his legs.

## 2024-04-25 NOTE — ED PROVIDER NOTE - CCCP TRG CHIEF CMPLNT
From: Latoya Cullen  To: Jerica Ayana  Sent: 4/25/2024 6:38 AM CDT  Subject: Wili CALDERA,  I went on a cruise in March and came home with a rash on my butt. I thought it was a heat rash or something causes bc of the salt water / sand but it is spreading. It feels like pimples but doesn’t itch but it is totally grossing me out and I want it gone! ;-) HELP PLEASE!!! What can I put on it? I’ve tried Neosporin and Benadryl! No luck but to be honest I’m too nervous to put anything down by that area!   Let me know if I need to make an appointment or if there’s something over the counter I can try!     THANK YOU!!!!!  Camila  
fall

## 2024-05-05 ENCOUNTER — EMERGENCY (EMERGENCY)
Facility: HOSPITAL | Age: 64
LOS: 1 days | Discharge: HOME | End: 2024-05-05
Attending: EMERGENCY MEDICINE
Payer: COMMERCIAL

## 2024-05-05 VITALS
HEART RATE: 94 BPM | TEMPERATURE: 98 F | RESPIRATION RATE: 18 BRPM | OXYGEN SATURATION: 97 % | DIASTOLIC BLOOD PRESSURE: 82 MMHG | SYSTOLIC BLOOD PRESSURE: 158 MMHG

## 2024-05-05 VITALS
HEIGHT: 71 IN | SYSTOLIC BLOOD PRESSURE: 175 MMHG | DIASTOLIC BLOOD PRESSURE: 98 MMHG | HEART RATE: 84 BPM | TEMPERATURE: 98 F | RESPIRATION RATE: 16 BRPM | OXYGEN SATURATION: 98 % | WEIGHT: 220.02 LBS

## 2024-05-05 DIAGNOSIS — Z98.890 OTHER SPECIFIED POSTPROCEDURAL STATES: Chronic | ICD-10-CM

## 2024-05-05 LAB
ALBUMIN SERPL ELPH-MCNC: 4.2 G/DL — SIGNIFICANT CHANGE UP (ref 3.3–5.2)
ALP SERPL-CCNC: 95 U/L — SIGNIFICANT CHANGE UP (ref 40–120)
ALT FLD-CCNC: 47 U/L — HIGH
ANION GAP SERPL CALC-SCNC: 16 MMOL/L — SIGNIFICANT CHANGE UP (ref 5–17)
AST SERPL-CCNC: 88 U/L — HIGH
BASOPHILS # BLD AUTO: 0.03 K/UL — SIGNIFICANT CHANGE UP (ref 0–0.2)
BASOPHILS NFR BLD AUTO: 0.5 % — SIGNIFICANT CHANGE UP (ref 0–2)
BILIRUB SERPL-MCNC: 0.4 MG/DL — SIGNIFICANT CHANGE UP (ref 0.4–2)
BUN SERPL-MCNC: 9.6 MG/DL — SIGNIFICANT CHANGE UP (ref 8–20)
CALCIUM SERPL-MCNC: 9.3 MG/DL — SIGNIFICANT CHANGE UP (ref 8.4–10.5)
CHLORIDE SERPL-SCNC: 103 MMOL/L — SIGNIFICANT CHANGE UP (ref 96–108)
CO2 SERPL-SCNC: 23 MMOL/L — SIGNIFICANT CHANGE UP (ref 22–29)
CREAT SERPL-MCNC: 0.61 MG/DL — SIGNIFICANT CHANGE UP (ref 0.5–1.3)
EGFR: 108 ML/MIN/1.73M2 — SIGNIFICANT CHANGE UP
EOSINOPHIL # BLD AUTO: 0.05 K/UL — SIGNIFICANT CHANGE UP (ref 0–0.5)
EOSINOPHIL NFR BLD AUTO: 0.9 % — SIGNIFICANT CHANGE UP (ref 0–6)
ETHANOL SERPL-MCNC: 409 MG/DL — HIGH (ref 0–9)
GLUCOSE SERPL-MCNC: 101 MG/DL — HIGH (ref 70–99)
HCT VFR BLD CALC: 44.5 % — SIGNIFICANT CHANGE UP (ref 39–50)
HGB BLD-MCNC: 14.8 G/DL — SIGNIFICANT CHANGE UP (ref 13–17)
IMM GRANULOCYTES NFR BLD AUTO: 0.2 % — SIGNIFICANT CHANGE UP (ref 0–0.9)
LYMPHOCYTES # BLD AUTO: 2.28 K/UL — SIGNIFICANT CHANGE UP (ref 1–3.3)
LYMPHOCYTES # BLD AUTO: 39 % — SIGNIFICANT CHANGE UP (ref 13–44)
MCHC RBC-ENTMCNC: 30.3 PG — SIGNIFICANT CHANGE UP (ref 27–34)
MCHC RBC-ENTMCNC: 33.3 GM/DL — SIGNIFICANT CHANGE UP (ref 32–36)
MCV RBC AUTO: 91 FL — SIGNIFICANT CHANGE UP (ref 80–100)
MONOCYTES # BLD AUTO: 0.86 K/UL — SIGNIFICANT CHANGE UP (ref 0–0.9)
MONOCYTES NFR BLD AUTO: 14.7 % — HIGH (ref 2–14)
NEUTROPHILS # BLD AUTO: 2.62 K/UL — SIGNIFICANT CHANGE UP (ref 1.8–7.4)
NEUTROPHILS NFR BLD AUTO: 44.7 % — SIGNIFICANT CHANGE UP (ref 43–77)
PLATELET # BLD AUTO: 196 K/UL — SIGNIFICANT CHANGE UP (ref 150–400)
POTASSIUM SERPL-MCNC: 4 MMOL/L — SIGNIFICANT CHANGE UP (ref 3.5–5.3)
POTASSIUM SERPL-SCNC: 4 MMOL/L — SIGNIFICANT CHANGE UP (ref 3.5–5.3)
PROT SERPL-MCNC: 7.6 G/DL — SIGNIFICANT CHANGE UP (ref 6.6–8.7)
RBC # BLD: 4.89 M/UL — SIGNIFICANT CHANGE UP (ref 4.2–5.8)
RBC # FLD: 15.6 % — HIGH (ref 10.3–14.5)
SODIUM SERPL-SCNC: 142 MMOL/L — SIGNIFICANT CHANGE UP (ref 135–145)
WBC # BLD: 5.85 K/UL — SIGNIFICANT CHANGE UP (ref 3.8–10.5)
WBC # FLD AUTO: 5.85 K/UL — SIGNIFICANT CHANGE UP (ref 3.8–10.5)

## 2024-05-05 PROCEDURE — 72125 CT NECK SPINE W/O DYE: CPT | Mod: MC

## 2024-05-05 PROCEDURE — 72125 CT NECK SPINE W/O DYE: CPT | Mod: 26,MC

## 2024-05-05 PROCEDURE — 82962 GLUCOSE BLOOD TEST: CPT

## 2024-05-05 PROCEDURE — 85025 COMPLETE CBC W/AUTO DIFF WBC: CPT

## 2024-05-05 PROCEDURE — 70450 CT HEAD/BRAIN W/O DYE: CPT | Mod: MC

## 2024-05-05 PROCEDURE — 80307 DRUG TEST PRSMV CHEM ANLYZR: CPT

## 2024-05-05 PROCEDURE — 99284 EMERGENCY DEPT VISIT MOD MDM: CPT

## 2024-05-05 PROCEDURE — 70450 CT HEAD/BRAIN W/O DYE: CPT | Mod: 26,MC

## 2024-05-05 PROCEDURE — 99285 EMERGENCY DEPT VISIT HI MDM: CPT | Mod: 25

## 2024-05-05 PROCEDURE — 80053 COMPREHEN METABOLIC PANEL: CPT

## 2024-05-05 PROCEDURE — 36415 COLL VENOUS BLD VENIPUNCTURE: CPT

## 2024-05-05 NOTE — ED ADULT NURSE NOTE - NSFALLRISKINTERV_ED_ALL_ED
Assistance OOB with selected safe patient handling equipment if applicable/Assistance with ambulation/Communicate fall risk and risk factors to all staff, patient, and family/Monitor gait and stability/Monitor for mental status changes and reorient to person, place, and time, as needed/Provide visual cue: yellow wristband, yellow gown, etc/Reinforce activity limits and safety measures with patient and family/Toileting schedule using arm’s reach rule for commode and bathroom/Use of alarms - bed, stretcher, chair and/or video monitoring/Call bell, personal items and telephone in reach/Instruct patient to call for assistance before getting out of bed/chair/stretcher/Non-slip footwear applied when patient is off stretcher/[place] to call system/Physically safe environment - no spills, clutter or unnecessary equipment/Purposeful Proactive Rounding/Room/bathroom lighting operational, light cord in reach

## 2024-05-05 NOTE — ED ADULT NURSE NOTE - OBJECTIVE STATEMENT
Pt. received A+Ox2, confused on situation/where he is, apparent intox and admits to drinking "a lot". [place] and [place] involved, unsure of specifics. No other obvious deficits or deformities on exam.

## 2024-05-05 NOTE — ED PROVIDER NOTE - NSFOLLOWUPINSTRUCTIONS_ED_ALL_ED_FT
- Return to the emergency department for worsening or concerning symptoms.  - SEEK IMMEDIATE MEDICAL CARE IF YOU HAVE ANY OF THE FOLLOWING SYMPTOMS: seizures, vomiting blood, blood in your stool, lightheadedness/dizziness, or becoming shaky to tremulous when you stop drinking.    Alcohol Abuse    Alcohol intoxication occurs when the amount of alcohol that a person has consumed impairs his or her ability to mentally and physically function. Chronic alcohol consumption can also lead to a variety of health issues including neurological disease, stomach disease, heart disease, liver disease, etc. Do not drive after drinking alcohol. Drinking enough alcohol to end up in an Emergency Room suggests you may have an alcohol abuse problem. Seek help at a drug addiction center.    - The contact information for the [facility] is below. Please reach out to them for assitance and resources.     [facility]  [facility]  [address] [place] [address]  [phone]  [email or web address]

## 2024-05-05 NOTE — ED PROVIDER NOTE - CLINICAL SUMMARY MEDICAL DECISION MAKING FREE TEXT BOX
64-year-old male with PMH alcohol abuse brought in via by EMS for intoxication.  patient is overall none toxic appearing with a benign physical exam.  He has a superficial abrasion to the left forehead that does not require repair.  Given patient is a poor historian and is currently intoxicated with unclear trauma will send for priority head CT to rule out ICH.  Basic blood work and alcohol level ordered.  Will monitor patient in the emergency department for clinical sobriety.

## 2024-05-05 NOTE — ED PROVIDER NOTE - PATIENT PORTAL LINK FT
You can access the FollowMyHealth Patient Portal offered by [facility] by registering at the following website: [email or web address]. By joining [employer]’s FollowMyHealth portal, you will also be able to view your health information using other applications (apps) compatible with our system.

## 2024-05-05 NOTE — ED ADULT TRIAGE NOTE - CCCP TRG CHIEF CMPLNT
Pre-Visit Chart Review  For Appointment Scheduled on 05/25/2017      Health Maintenance Due   Topic Date Due    Zoster Vaccine  12/23/2012    Foot Exam  03/24/2017                      ingestion

## 2024-05-05 NOTE — ED PROVIDER NOTE - PHYSICAL EXAMINATION
Gen: no acute distress  Head: normocephalic  EENT: EOMI  Lung: no increased work of breathing, CTABL  CV: normal s1/s2  Abd: soft, non-tender, non-distended  MSK: no visible deformities, full range of motion in all 4 extremities  Neuro: A&Ox4; No focal neurologic deficits  No  Skin: small superficial abrasion to left forehead

## 2024-05-05 NOTE — ED ADULT TRIAGE NOTE - CHIEF COMPLAINT QUOTE
c/o found on the street, admits to drinking vodka today. Small brasion noted to forehead but denies fall. Pt changed into yellow gown, belongings secured

## 2024-05-05 NOTE — ED PROVIDER NOTE - ATTENDING CONTRIBUTION TO CARE
I performed a face to face bedside interview with patient regarding history of present illness, review of symptoms and past medical history. I completed an independent physical exam.  I have discussed patient's plan of care with resident.   I agree with note as stated above including HISTORY OF PRESENT ILLNESS, HIV, PAST MEDICAL/SURGICAL/FAMILY/SOCIAL HISTORY, ALLERGIES AND HOME MEDICATIONS, REVIEW OF SYSTEMS, PHYSICAL EXAM, MEDICAL DECISION MAKING and any PROGRESS NOTES during the time I functioned as the attending physician for this patient unless otherwise noted. My brief assessment is as follows:   General no acute distress smells of alcohol respiratory clear cardiac no murmur abdomen soft neuro no lateralized deficits   refusing the detox clinically sober ambulating no acute distress return to ED for any other acute symptoms or concerns agree with resident plan of care

## 2024-05-05 NOTE — ED PROVIDER NOTE - OBJECTIVE STATEMENT
64-year-old male with PMH alcohol abuse brought in via by EMS for intoxication.  Patient was found on the street and was noted to have a small abrasion to the left side of his forehead.  Patient himself denies any falls however he admits to drinking and is intoxicated on exam.  Patient is without other complaints at this time including chest pain, shortness of breath, headache, abdominal pain.  He denies any medical history or medication use.

## 2024-05-06 PROBLEM — I10 ESSENTIAL (PRIMARY) HYPERTENSION: Chronic | Status: ACTIVE | Noted: 2023-10-12

## 2024-05-06 NOTE — ED ADULT NURSE REASSESSMENT NOTE - NS ED NURSE REASSESS COMMENT FT1
Pt. drowsy but easily arousable, airway patent, A+O to self, time, but not situation or place, confused as to why he is in the hospital. Updated on plan of care.

## 2024-05-08 DIAGNOSIS — F10.129 ALCOHOL ABUSE WITH INTOXICATION, UNSPECIFIED: ICD-10-CM

## 2024-05-08 DIAGNOSIS — S00.81XA ABRASION OF OTHER PART OF HEAD, INITIAL ENCOUNTER: ICD-10-CM
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
## 2024-06-12 NOTE — ED PROVIDER NOTE - CARDIOVASCULAR [+], MLM
CHEST PAIN Siddharth Orosco  Plastic Surgery  [address] [facility], [address]  [place] [address]  Phone: [phone]  Fax: [phone]  Follow Up Time: 1 week

## 2024-12-15 ENCOUNTER — EMERGENCY (EMERGENCY)
Facility: HOSPITAL | Age: 64
LOS: 1 days | Discharge: HOME | End: 2024-12-15
Attending: EMERGENCY MEDICINE
Payer: COMMERCIAL

## 2024-12-15 VITALS
WEIGHT: 229.94 LBS | RESPIRATION RATE: 18 BRPM | HEART RATE: 103 BPM | TEMPERATURE: 98 F | OXYGEN SATURATION: 97 % | DIASTOLIC BLOOD PRESSURE: 107 MMHG | SYSTOLIC BLOOD PRESSURE: 157 MMHG

## 2024-12-15 DIAGNOSIS — Z98.890 OTHER SPECIFIED POSTPROCEDURAL STATES: Chronic | ICD-10-CM

## 2024-12-15 LAB
ALBUMIN SERPL ELPH-MCNC: 4.1 G/DL — SIGNIFICANT CHANGE UP (ref 3.3–5.2)
ALP SERPL-CCNC: 82 U/L — SIGNIFICANT CHANGE UP (ref 40–120)
ALT FLD-CCNC: 68 U/L — HIGH
ANION GAP SERPL CALC-SCNC: 12 MMOL/L — SIGNIFICANT CHANGE UP (ref 5–17)
APTT BLD: 29.5 SEC — SIGNIFICANT CHANGE UP (ref 24.5–35.6)
AST SERPL-CCNC: 110 U/L — HIGH
BASOPHILS # BLD AUTO: 0.03 K/UL — SIGNIFICANT CHANGE UP (ref 0–0.2)
BASOPHILS NFR BLD AUTO: 0.7 % — SIGNIFICANT CHANGE UP (ref 0–2)
BILIRUB SERPL-MCNC: 0.4 MG/DL — SIGNIFICANT CHANGE UP (ref 0.4–2)
BLD GP AB SCN SERPL QL: SIGNIFICANT CHANGE UP
BUN SERPL-MCNC: 19.3 MG/DL — SIGNIFICANT CHANGE UP (ref 8–20)
CALCIUM SERPL-MCNC: 8.9 MG/DL — SIGNIFICANT CHANGE UP (ref 8.4–10.5)
CHLORIDE SERPL-SCNC: 102 MMOL/L — SIGNIFICANT CHANGE UP (ref 96–108)
CO2 SERPL-SCNC: 26 MMOL/L — SIGNIFICANT CHANGE UP (ref 22–29)
CREAT SERPL-MCNC: 0.72 MG/DL — SIGNIFICANT CHANGE UP (ref 0.5–1.3)
EGFR: 103 ML/MIN/1.73M2 — SIGNIFICANT CHANGE UP
EOSINOPHIL # BLD AUTO: 0.07 K/UL — SIGNIFICANT CHANGE UP (ref 0–0.5)
EOSINOPHIL NFR BLD AUTO: 1.7 % — SIGNIFICANT CHANGE UP (ref 0–6)
ETHANOL SERPL-MCNC: 311 MG/DL — HIGH (ref 0–9)
GLUCOSE SERPL-MCNC: 105 MG/DL — HIGH (ref 70–99)
HCT VFR BLD CALC: 41.1 % — SIGNIFICANT CHANGE UP (ref 39–50)
HGB BLD-MCNC: 13.9 G/DL — SIGNIFICANT CHANGE UP (ref 13–17)
IMM GRANULOCYTES NFR BLD AUTO: 0.2 % — SIGNIFICANT CHANGE UP (ref 0–0.9)
INR BLD: 0.97 RATIO — SIGNIFICANT CHANGE UP (ref 0.85–1.16)
LYMPHOCYTES # BLD AUTO: 1.24 K/UL — SIGNIFICANT CHANGE UP (ref 1–3.3)
LYMPHOCYTES # BLD AUTO: 29.4 % — SIGNIFICANT CHANGE UP (ref 13–44)
MCHC RBC-ENTMCNC: 29.6 PG — SIGNIFICANT CHANGE UP (ref 27–34)
MCHC RBC-ENTMCNC: 33.8 G/DL — SIGNIFICANT CHANGE UP (ref 32–36)
MCV RBC AUTO: 87.6 FL — SIGNIFICANT CHANGE UP (ref 80–100)
MONOCYTES # BLD AUTO: 0.62 K/UL — SIGNIFICANT CHANGE UP (ref 0–0.9)
MONOCYTES NFR BLD AUTO: 14.7 % — HIGH (ref 2–14)
NEUTROPHILS # BLD AUTO: 2.25 K/UL — SIGNIFICANT CHANGE UP (ref 1.8–7.4)
NEUTROPHILS NFR BLD AUTO: 53.3 % — SIGNIFICANT CHANGE UP (ref 43–77)
PLATELET # BLD AUTO: 157 K/UL — SIGNIFICANT CHANGE UP (ref 150–400)
POTASSIUM SERPL-MCNC: 3.9 MMOL/L — SIGNIFICANT CHANGE UP (ref 3.5–5.3)
POTASSIUM SERPL-SCNC: 3.9 MMOL/L — SIGNIFICANT CHANGE UP (ref 3.5–5.3)
PROT SERPL-MCNC: 7.2 G/DL — SIGNIFICANT CHANGE UP (ref 6.6–8.7)
PROTHROM AB SERPL-ACNC: 11.2 SEC — SIGNIFICANT CHANGE UP (ref 9.9–13.4)
RBC # BLD: 4.69 M/UL — SIGNIFICANT CHANGE UP (ref 4.2–5.8)
RBC # FLD: 14.6 % — HIGH (ref 10.3–14.5)
SODIUM SERPL-SCNC: 140 MMOL/L — SIGNIFICANT CHANGE UP (ref 135–145)
WBC # BLD: 4.22 K/UL — SIGNIFICANT CHANGE UP (ref 3.8–10.5)
WBC # FLD AUTO: 4.22 K/UL — SIGNIFICANT CHANGE UP (ref 3.8–10.5)

## 2024-12-15 PROCEDURE — 70496 CT ANGIOGRAPHY HEAD: CPT | Mod: 26,MC

## 2024-12-15 PROCEDURE — 72125 CT NECK SPINE W/O DYE: CPT | Mod: 26,MC

## 2024-12-15 PROCEDURE — 99285 EMERGENCY DEPT VISIT HI MDM: CPT | Mod: 25

## 2024-12-15 PROCEDURE — 70486 CT MAXILLOFACIAL W/O DYE: CPT | Mod: 26,MC

## 2024-12-15 PROCEDURE — 93010 ELECTROCARDIOGRAM REPORT: CPT

## 2024-12-15 PROCEDURE — 70498 CT ANGIOGRAPHY NECK: CPT | Mod: 26,MC

## 2024-12-15 PROCEDURE — 12013 RPR F/E/E/N/L/M 2.6-5.0 CM: CPT

## 2024-12-15 PROCEDURE — 70450 CT HEAD/BRAIN W/O DYE: CPT | Mod: 26,MC,59

## 2024-12-15 NOTE — ED ADULT NURSE NOTE - NSSUHOSCREENINGYN_ED_ALL_ED
H&P reviewed. The patient was examined and there are no changes to the H&P.      Risks and benefits discussed with patient. Patient understands these and would like to proceed with procedure.     Yes - the patient is able to be screened

## 2024-12-15 NOTE — ED PROVIDER NOTE - OBJECTIVE STATEMENT
64 yoM; with PMH significant HTN, HLD, cerebral aneurysm (not repair), alcohol abuse; now presenting s/p fall while intoxicated.  Patient report + head trauma.?  LOC.  Denies nausea or vomiting.  Denies chest pain, shortness of breath, palpitations prior to the fall.

## 2024-12-15 NOTE — ED PROVIDER NOTE - NS ED ROS FT
Constitutional: (-) fever  (-)chills  (-)sweats  Eyes/ENT: +epistaxis  Cardiovascular: (-) chest pain, (-) palpitations (-) edema   Respiratory: (-) cough, (-) shortness of breath   Gastrointestinal: (-)nausea  (-)vomiting, (-) diarrhea  (-) abdominal pain   :  (-)dysuria, (-)frequency, (-)urgency, (-)hematuria  Musculoskeletal: (-) neck pain, (-) back pain, (-) joint pain  Integumentary: (-) rash, (-) edema  Neurological: (+) headache, (-) altered mental status  (-)LOC

## 2024-12-15 NOTE — ED ADULT NURSE NOTE - NSFALLASSESSNEED_ED_ALL_ED
Patient is a 63y old  Female who presents with a chief complaint of 63F p/w generalized weakness and difficulty walking (19 Aug 2020 11:45)    HPI: "Feels terrible". Appears well.     Vital Signs Last 24 Hrs  T(C): 37.1 (19 Aug 2020 05:08), Max: 37.1 (18 Aug 2020 21:17)  T(F): 98.7 (19 Aug 2020 05:08), Max: 98.7 (18 Aug 2020 21:17)  HR: 90 (19 Aug 2020 08:41) (72 - 101)  BP: 137/72 (19 Aug 2020 05:08) (137/72 - 138/59)  BP(mean): --  RR: 20 (19 Aug 2020 08:41) (19 - 21)  SpO2: 96% (19 Aug 2020 08:41) (95% - 100%)                          7.9    8.96  )-----------( 232      ( 19 Aug 2020 07:00 )             26.4     08-19    137  |  93<L>  |  26<H>  ----------------------------<  142<H>  3.8   |  35<H>  |  1.06    Ca    9.0      19 Aug 2020 07:00    MEDICATIONS  (STANDING):  albuterol/ipratropium for Nebulization 3 milliLiter(s) Nebulizer every 6 hours  apixaban 5 milliGRAM(s) Oral every 12 hours  aspirin enteric coated 81 milliGRAM(s) Oral daily  atorvastatin 80 milliGRAM(s) Oral at bedtime  azithromycin   Tablet 250 milliGRAM(s) Oral <User Schedule>  Biotene Dry Mouth Oral Rinse 5 milliLiter(s) Swish and Spit two times a day  bisacodyl Suppository 10 milliGRAM(s) Rectal daily  budesonide 160 MICROgram(s)/formoterol 4.5 MICROgram(s) Inhaler 2 Puff(s) Inhalation two times a day  buMETAnide 1 milliGRAM(s) Oral daily  chlorhexidine 2% Cloths 1 Application(s) Topical daily  cholecalciferol 2000 Unit(s) Oral daily  dextrose 5%. 1000 milliLiter(s) (50 mL/Hr) IV Continuous <Continuous>  dextrose 50% Injectable 25 Gram(s) IV Push once  diltiazem    milliGRAM(s) Oral daily  insulin glargine Injectable (LANTUS) 20 Unit(s) SubCutaneous at bedtime  insulin lispro (HumaLOG) corrective regimen sliding scale   SubCutaneous three times a day before meals  insulin lispro (HumaLOG) corrective regimen sliding scale   SubCutaneous at bedtime  insulin lispro Injectable (HumaLOG) 8 Unit(s) SubCutaneous three times a day with meals  lactulose Syrup 15 Gram(s) Oral two times a day  montelukast 10 milliGRAM(s) Oral daily  multivitamin 1 Tablet(s) Oral daily  pantoprazole    Tablet 40 milliGRAM(s) Oral before breakfast  polyethylene glycol 3350 17 Gram(s) Oral daily  senna 2 Tablet(s) Oral at bedtime  theophylline ER (24 Hour) 400 milliGRAM(s) Oral daily  tiotropium 18 MICROgram(s) Capsule 1 Capsule(s) Inhalation daily    MEDICATIONS  (PRN):  ALPRAZolam 0.25 milliGRAM(s) Oral two times a day PRN Anxiety  glucagon  Injectable 1 milliGRAM(s) IntraMuscular once PRN Glucose LESS THAN 70 milligrams/deciliter  guaiFENesin   Syrup  (Sugar-Free) 100 milliGRAM(s) Oral every 6 hours PRN Cough no

## 2024-12-15 NOTE — ED PROVIDER NOTE - CLINICAL SUMMARY MEDICAL DECISION MAKING FREE TEXT BOX
64 yoM; with PMH significant HTN, HLD, cerebral aneurysm (not repair), alcohol abuse; now presenting s/p fall while intoxicated.  Patient report + head trauma.?  LOC.  Denies nausea or vomiting.  Denies chest pain, shortness of breath, palpitations prior to the fall. priority ct, labs, lac repair, re-eval. 64 yoM; with PMH significant HTN, HLD, cerebral aneurysm (not repair), alcohol abuse; now presenting s/p fall while intoxicated.  Patient report + head trauma.?  LOC.  Denies nausea or vomiting.  Denies chest pain, shortness of breath, palpitations prior to the fall. priority ct, labs, lac repair, nsg for cerebral aneurysm, observe until sobriety.

## 2024-12-15 NOTE — ED PROVIDER NOTE - PHYSICAL EXAMINATION
Gen: awake, intoxicated  Head: NC, AT, PERRL, EOMI, normal lids/conjunctiva  ENT: 2cm lac to mid-forehead, obvious deformity to nose, no nasal septal hematoma  Neck: +supple, no tenderness/meningismus/JVD, +Trachea midline  Pulm: Bilateral BS, normal resp effort, no wheeze/stridor/retractions  CV: RRR, no M/R/G, +dist pulses  Abd: soft, NT/ND, +BS, no hepatosplenomegaly  Mskel:    L UE: from/nt @shoulder/elbow/wrist/hand   R UE: from/nt @shoulder/elbow/wrist/hand   L LE: from/nt @ hip/knee/ankle   R LE: from/nt @hip/knee/ankle   distal pulses intact  BACK: nt midline c/t/l/s spine.   Neuro: AAOx3, no sensory/motor deficit

## 2024-12-15 NOTE — ED ADULT NURSE NOTE - NSFALLUNIVINTERV_ED_ALL_ED
Bed/Stretcher in lowest position, wheels locked, appropriate side rails in place/Call bell, personal items and telephone in reach/Instruct patient to call for assistance before getting out of bed/chair/stretcher/Non-slip footwear applied when patient is off stretcher/[place] to call system/Physically safe environment - no spills, clutter or unnecessary equipment/Purposeful proactive rounding/Room/bathroom lighting operational, light cord in reach

## 2024-12-15 NOTE — ED PROVIDER NOTE - PATIENT PORTAL LINK FT
You can access the FollowMyHealth Patient Portal offered by [facility] by registering at the following website: [email or web address]. By joining [facility]’s FollowMyHealth portal, you will also be able to view your health information using other applications (apps) compatible with our system.
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
## 2024-12-15 NOTE — ED ADULT NURSE REASSESSMENT NOTE - NS ED NURSE REASSESS COMMENT FT1
Assumed care from previous RN. Patient presented to ED s/p fall and ETOH use. Patient taken to CT scan on arrival. Patient pending neurosurgery consult. Aware of POC.

## 2024-12-15 NOTE — ED PROVIDER NOTE - SHIFT CHANGE DETAILS
Progress Note (MD Franklin): Patient signed out to incoming physician.  All decisions regarding the progression of care will be made at their discretion.

## 2024-12-15 NOTE — ED PROVIDER NOTE - PROGRESS NOTE DETAILS
Patient awake GCS 15 ambulate patient he is well aware of the aneurysm he has ascites had to get followed up every 6 months he is clinically sober no headache no chest pain no shortness of breath no motor or sensory deficits advised on cessation to very much reduction of his alcohol abuse

## 2024-12-16 VITALS
HEART RATE: 87 BPM | TEMPERATURE: 98 F | RESPIRATION RATE: 18 BRPM | SYSTOLIC BLOOD PRESSURE: 162 MMHG | OXYGEN SATURATION: 95 % | DIASTOLIC BLOOD PRESSURE: 79 MMHG

## 2024-12-16 PROCEDURE — 80053 COMPREHEN METABOLIC PANEL: CPT

## 2024-12-16 PROCEDURE — 86900 BLOOD TYPING SEROLOGIC ABO: CPT

## 2024-12-16 PROCEDURE — 72125 CT NECK SPINE W/O DYE: CPT | Mod: MC

## 2024-12-16 PROCEDURE — 12013 RPR F/E/E/N/L/M 2.6-5.0 CM: CPT

## 2024-12-16 PROCEDURE — 80307 DRUG TEST PRSMV CHEM ANLYZR: CPT

## 2024-12-16 PROCEDURE — 93005 ELECTROCARDIOGRAM TRACING: CPT

## 2024-12-16 PROCEDURE — 70486 CT MAXILLOFACIAL W/O DYE: CPT | Mod: MC

## 2024-12-16 PROCEDURE — 86901 BLOOD TYPING SEROLOGIC RH(D): CPT

## 2024-12-16 PROCEDURE — 85610 PROTHROMBIN TIME: CPT

## 2024-12-16 PROCEDURE — 70496 CT ANGIOGRAPHY HEAD: CPT | Mod: MC

## 2024-12-16 PROCEDURE — 70450 CT HEAD/BRAIN W/O DYE: CPT | Mod: MC

## 2024-12-16 PROCEDURE — 85025 COMPLETE CBC W/AUTO DIFF WBC: CPT

## 2024-12-16 PROCEDURE — 86850 RBC ANTIBODY SCREEN: CPT

## 2024-12-16 PROCEDURE — 70498 CT ANGIOGRAPHY NECK: CPT | Mod: MC

## 2024-12-16 PROCEDURE — 85730 THROMBOPLASTIN TIME PARTIAL: CPT

## 2024-12-16 PROCEDURE — 99285 EMERGENCY DEPT VISIT HI MDM: CPT | Mod: 25

## 2024-12-16 PROCEDURE — 36415 COLL VENOUS BLD VENIPUNCTURE: CPT

## 2024-12-16 PROCEDURE — 82962 GLUCOSE BLOOD TEST: CPT

## 2024-12-16 RX ORDER — IBUPROFEN 200 MG
600 TABLET ORAL ONCE
Refills: 0 | Status: DISCONTINUED | OUTPATIENT
Start: 2024-12-16 | End: 2024-12-23

## 2024-12-16 RX ORDER — ACETAMINOPHEN 500MG 500 MG/1
650 TABLET, COATED ORAL ONCE
Refills: 0 | Status: DISCONTINUED | OUTPATIENT
Start: 2024-12-16 | End: 2024-12-23

## 2025-02-19 NOTE — ED PROCEDURE NOTE - CPROC ED LOCAL ANESTHESIA1
2025       Randy Bowman MD  [id number]  [facility] [address]  Via Fax: [phone]      Patient: Nikko Jackson   YOB: 1939   Date of Visit: 2025       Dear Dr. Bowman:    Thank you for referring Randy Jackson to me for evaluation. Below are my notes for this visit with him.    If you have questions, please do not hesitate to call me. I look forward to following your patient along with you.      Sincerely,        Anant Traore MD        CC: No Recipients  Anant Traore MD  2025 11:16 AM  Signed  2025    [address]  [facility] [address]        Clinic Note    Nikko Jackson  : 1939  PCP: Randy Bowman MD    Reason for Visit:     Chief Complaint   Patient presents with   • Follow-up     Paroxysmal atrial fibrillation       History of Present Illness:   Nikko Jackson is a 85 year old man who I follow in the office for symptomatic afib.  Echocardiogram in  showed normal LV size and function with no significant valvular abnormalities.  He did have some symptomatic bradycardia on a higher dose beta-blocker but on the lower dose he has been doing quite well.  He checks his blood pressure and heart rate routinely at home.  His blood pressures run in the 130s to 140s systolic.  His heart rates were in the mid 50s.       He ultimately was diagnosed with symptomatic persistent afib in 2022.  He underwent DCCV and had recurrence and I subsequently brought him for a cryoballoon A-fib ablation procedure on 2023.  He was started on flecainide as well.  On 2023 he started feeling unwell and has a cardia mobile device which again notified him of recurrent A-fib with a heart rate of 102 bpm.       Workup continued to reveal recurrent atypical atrial flutter despite flecainide      I ultimately brought him back to the EP laboratory in 2023.  A few of his veins were reconnected and so those will be isolated.  He also  underwent a typical atrial flutter ablation.  I also mapped his atypical atrial flutter likely was a mitral isthmus flutter.  With a mitral isthmus line there was some slowing but ultimately needed to cardiovert him.  He remains on Eliquis and flecainide.       He feels good.  Walking 45 minutes to an hour every day.  Has some mild dizziness with positional changes.  Checks BP at home and runnin higher.  He had wanted to stop the Eliquis and therefore is currently not on it.     14-day event monitor in October showed sinus rhythm throughout with no A-fib or atrial flutter.  He had a 3.3% PAC burden.  This is based on the report.  I do not have the strips.  I last saw him in November and presents for f/u.     Nuclear stress test in January 2023 was negative for ischemia.  Echocardiogram in December 2022 showed an EF of 55% with moderate left atrial lodgment.    EKG performed office shows sinus rhythm with a first-degree AV block.  PMHx:     Past Medical History:   Diagnosis Date   • A-fib  (CMD)    • Essential hypertension    • PAC (premature atrial contraction)    • Premature ventricular contractions (PVCs) (VPCs)        PSHx:     Past Surgical History:   Procedure Laterality Date   • Ablation - atrial fibrillation  01/2023   • Ablation atrial flutter - cv  07/31/2023   • Cardioversion  11/2022   • Cardioversion  03/2023   • Cardioversion  07/31/2023   • Joint replacement Right     KNEE REPLACEMENT   • Joint replacement Left     KNEE REPLACEMENT       Family Hx:     Family History   Problem Relation Age of Onset   • Coronary Artery Disease Other          Significant for premature CAD.   • Aneurysm Neg Hx         Negative for AAA.       Social Hx:     Social History     Tobacco Use   • Smoking status: Never   • Smokeless tobacco: Never   • Tobacco comments:      Never used tobacco. denies smoking   Vaping Use   • Vaping status: never used   Substance Use Topics   • Alcohol use: Yes     Comment: drinks socially.        Allergies:      ALLERGIES:   Allergen Reactions   • Codeine RASH       Medications:     Current Outpatient Medications   Medication Sig Dispense Refill   • hydroCHLOROthiazide 12.5 MG tablet TAKE 1 TABLET BY MOUTH DAILY 90 tablet 3   • losartan (COZAAR) 50 MG tablet TAKE 1 TABLET BY MOUTH DAILY 90 tablet 3   • flecainide (TAMBOCOR) 50 MG tablet Take 1 tablet by mouth in the morning and 1 tablet in the evening. 180 tablet 3     No current facility-administered medications for this visit.       Review of Systems:   ROS Eye Problem(s):negative  ENT Problem(s):negative  Cardiovascular problem(s):negative  Respiratory problem(s):negative  Gastro-intestinal problem(s):negative GI  Genito-urinary problem(s):negative  Musculoskeletal problem(s):negative  Integumentary problem(s):negative  Neurological problem(s):negative  Psychiatric problem(s):negative  Endocrine problem(s):negative  Hematologic and/or Lymphatic problem(s):negative      Physical Exam:   Vitals: Visit Vitals  /72 (BP Location: LUE - Left upper extremity, Patient Position: Sitting, Cuff Size: Large Adult)   Pulse 85   Ht 5' 9\" (1.753 m)   Wt 92.5 kg (204 lb)   BMI 30.13 kg/m²      General:  No acute distress.   HEENT: Mucosa moist, no scleral icterus   Lungs: Cear to auscultation   Cardiac: JVP normal. No carotid bruit.    RRR,    No edema   Pulses:  Pedal pulses nl     Femoral pulses nl    Carotid pulses nl   Abdomen: Soft, non tender, difficult to assess abd aorta   Musculoskeletal: Gait normal. No tendon xanthoma.    Skin: Warm, no cyanosis.   Neuro: A & O   Psychiatric: Normal affect.        Labs:     Cholesterol, Total (mg/dL)   Date Value   09/04/2015 164        LDL Cholesterol (mg/dL)   Date Value   09/04/2015 96        HDL Cholesterol (mg/dL)   Date Value   09/04/2015 53        Triglycerides (mg/dL)   Date Value   09/04/2015 77      No results found for: \"AST\"   No results found for: \"ALT\"    No results for input(s): \"CHOLESTEROL\",  \"CALCLDL\", \"HDL\", \"TRIGLYCERIDE\", \"AST\", \"ALT\" in the last 72 hours.      Impression:         1. Paroxysmal atrial fibrillation  (CMD)         85-year-old male with a history of symptomatic A-fib/a flutter status post ablation x 2 presents for follow-up.    Plan:   1.  Continue low-dose flecainide.  2.  Has had no recurrence.  Per his preference he would prefer to stay off anticoagulation for the time being.  3.  I will see him back in 6 months.      Anant Traore MD  02/19/25   1% lidocaine

## 2025-02-21 ENCOUNTER — EMERGENCY (EMERGENCY)
Facility: HOSPITAL | Age: 65
LOS: 1 days | Discharge: HOME | End: 2025-02-21
Attending: STUDENT IN AN ORGANIZED HEALTH CARE EDUCATION/TRAINING PROGRAM
Payer: COMMERCIAL

## 2025-02-21 VITALS
DIASTOLIC BLOOD PRESSURE: 98 MMHG | OXYGEN SATURATION: 96 % | HEIGHT: 72 IN | WEIGHT: 220.02 LBS | RESPIRATION RATE: 20 BRPM | SYSTOLIC BLOOD PRESSURE: 163 MMHG | HEART RATE: 73 BPM | TEMPERATURE: 98 F

## 2025-02-21 VITALS
SYSTOLIC BLOOD PRESSURE: 164 MMHG | DIASTOLIC BLOOD PRESSURE: 97 MMHG | RESPIRATION RATE: 19 BRPM | OXYGEN SATURATION: 96 % | HEART RATE: 75 BPM | TEMPERATURE: 98 F

## 2025-02-21 DIAGNOSIS — Z98.890 OTHER SPECIFIED POSTPROCEDURAL STATES: Chronic | ICD-10-CM

## 2025-02-21 LAB
ALBUMIN SERPL ELPH-MCNC: 3.8 G/DL — SIGNIFICANT CHANGE UP (ref 3.3–5.2)
ALP SERPL-CCNC: 87 U/L — SIGNIFICANT CHANGE UP (ref 40–120)
ALT FLD-CCNC: 106 U/L — HIGH
ANION GAP SERPL CALC-SCNC: 12 MMOL/L — SIGNIFICANT CHANGE UP (ref 5–17)
APTT BLD: 31.8 SEC — SIGNIFICANT CHANGE UP (ref 24.5–35.6)
AST SERPL-CCNC: 119 U/L — HIGH
BILIRUB SERPL-MCNC: 0.5 MG/DL — SIGNIFICANT CHANGE UP (ref 0.4–2)
BUN SERPL-MCNC: 7.1 MG/DL — LOW (ref 8–20)
CALCIUM SERPL-MCNC: 9 MG/DL — SIGNIFICANT CHANGE UP (ref 8.4–10.5)
CHLORIDE SERPL-SCNC: 103 MMOL/L — SIGNIFICANT CHANGE UP (ref 96–108)
CO2 SERPL-SCNC: 27 MMOL/L — SIGNIFICANT CHANGE UP (ref 22–29)
CREAT SERPL-MCNC: 0.64 MG/DL — SIGNIFICANT CHANGE UP (ref 0.5–1.3)
EGFR: 106 ML/MIN/1.73M2 — SIGNIFICANT CHANGE UP
ETHANOL SERPL-MCNC: 225 MG/DL — HIGH (ref 0–9)
GLUCOSE SERPL-MCNC: 90 MG/DL — SIGNIFICANT CHANGE UP (ref 70–99)
HCT VFR BLD CALC: 42.3 % — SIGNIFICANT CHANGE UP (ref 39–50)
HGB BLD-MCNC: 13.9 G/DL — SIGNIFICANT CHANGE UP (ref 13–17)
INR BLD: 1.02 RATIO — SIGNIFICANT CHANGE UP (ref 0.85–1.16)
MCHC RBC-ENTMCNC: 29.6 PG — SIGNIFICANT CHANGE UP (ref 27–34)
MCHC RBC-ENTMCNC: 32.9 G/DL — SIGNIFICANT CHANGE UP (ref 32–36)
MCV RBC AUTO: 90.2 FL — SIGNIFICANT CHANGE UP (ref 80–100)
NRBC # BLD AUTO: 0 K/UL — SIGNIFICANT CHANGE UP (ref 0–0)
NRBC # FLD: 0 K/UL — SIGNIFICANT CHANGE UP (ref 0–0)
NRBC BLD AUTO-RTO: 0 /100 WBCS — SIGNIFICANT CHANGE UP (ref 0–0)
PLATELET # BLD AUTO: 279 K/UL — SIGNIFICANT CHANGE UP (ref 150–400)
PMV BLD: 9.5 FL — SIGNIFICANT CHANGE UP (ref 7–13)
POTASSIUM SERPL-MCNC: 3.7 MMOL/L — SIGNIFICANT CHANGE UP (ref 3.5–5.3)
POTASSIUM SERPL-SCNC: 3.7 MMOL/L — SIGNIFICANT CHANGE UP (ref 3.5–5.3)
PROT SERPL-MCNC: 7.1 G/DL — SIGNIFICANT CHANGE UP (ref 6.6–8.7)
PROTHROM AB SERPL-ACNC: 11.8 SEC — SIGNIFICANT CHANGE UP (ref 9.9–13.4)
RBC # BLD: 4.69 M/UL — SIGNIFICANT CHANGE UP (ref 4.2–5.8)
RBC # FLD: 14.6 % — HIGH (ref 10.3–14.5)
SODIUM SERPL-SCNC: 142 MMOL/L — SIGNIFICANT CHANGE UP (ref 135–145)
WBC # BLD: 4.91 K/UL — SIGNIFICANT CHANGE UP (ref 3.8–10.5)
WBC # FLD AUTO: 4.91 K/UL — SIGNIFICANT CHANGE UP (ref 3.8–10.5)

## 2025-02-21 PROCEDURE — 99285 EMERGENCY DEPT VISIT HI MDM: CPT

## 2025-02-21 RX ORDER — BACTERIOSTATIC SODIUM CHLORIDE 0.9 %
3 VIAL (ML) INJECTION ONCE
Refills: 0 | Status: COMPLETED | OUTPATIENT
Start: 2025-02-21 | End: 2025-02-21

## 2025-02-21 RX ADMIN — Medication 3 MILLILITER(S): at 21:27

## 2025-02-21 NOTE — ED PROVIDER NOTE - CARE PLAN
Principal Discharge DX:	Alcohol intoxication  Secondary Diagnosis:	Fall  Secondary Diagnosis:	Cerebral aneurysm   1

## 2025-02-21 NOTE — ED PROVIDER NOTE - CLINICAL SUMMARY MEDICAL DECISION MAKING FREE TEXT BOX
64-year-old male with history of EtOH abuse, hypertension and cerebral aneurysm status post clipping presents to the ED after drinking earlier today.  Patient sleeping but arousable denies any physical complaints at this time.  Patient denies any associate chest pain, shortness of breath, abdominal pain, nausea, vomiting hematemesis hematochezia or melanotic stools.   will observe in ED and reevaluate for clinical sobriety discharge when appropriate
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
## 2025-02-21 NOTE — ED PROVIDER NOTE - PATIENT PORTAL LINK FT
You can access the FollowMyHealth Patient Portal offered by [facility] by registering at the following website: [email or web address]. By joining [facility]’s FollowMyHealth portal, you will also be able to view your health information using other applications (apps) compatible with our system.

## 2025-02-21 NOTE — ED PROVIDER NOTE - OBJECTIVE STATEMENT
Patient Education Pt is a 51 y/o F w/no significant PMHx presents c/o shortness of breath.  Pt states that for the past two weeks she has had increasing shortness of breath.  She was prescribed Azithromycin by her PMD and completed the medication, but has not felt any better.  She states that most times she has seasonal allergies, but when she goes outside to get fresh air, her symptoms resolve.  she could find no relief, and started to feel a tightness in her chest. She came to Er for further evaluation.         ED, patient was tachycardic at 111 bpm, afebrile, hyptertensive at 148/93 mmHg and saturating at 94% on Rm Air. Labs on admission are notable for: Elevated D-dimer (739), Minimal Hypokalemia (3.4), Ferritin is low (14) with normal H/H. Normal coagulation profile. Negative troponin level. SARS-CoV2 PCR is negative. EKG is pending. CT-Angio Chest shows:     Segmental and subsegmental pulmonary emboli in the right lower lobe. Subsegmental pulmonary emboli involving the right upper lobe and lingula. Wedge-shaped peripheral groundglass opacities in the right upper lobe, left upper lobe, left lower lobe and lingula which may represent pulmonary infarcts. Patient was started on an Heparin infusion. and later changed to PO Eliquis.    Pt is feeling better now. Breathing well 96% RA. No chest pain,sob.    STABLE TO DISCHARGE        TIME SPENT >40 minutes for discharge 64-year-old male with history of EtOH abuse, hypertension and cerebral aneurysm status post clipping presents to the ED after drinking earlier today.  Patient sleeping but arousable denies any physical complaints at this time.  Patient denies any associate chest pain, shortness of breath, abdominal pain, nausea, vomiting hematemesis hematochezia or melanotic stools

## 2025-02-21 NOTE — ED PROVIDER NOTE - CONSTITUTIONAL, MLM
The pain, easily arousable, awake, alert, oriented to person, place, time/situation and in no apparent distress, Positive ALOOB normal...

## 2025-02-21 NOTE — ED ADULT NURSE NOTE - OBJECTIVE STATEMENT
Received in yellow gown with belonging secured prior to receiving. Pt c/o altered mental status . Pt is easily arousible, states he drinks 1 pint pf vodka daily. Last drink was this AM. Denies headache, SI/VH/AH. Respirations even & unlabored. NAD. Received pt in yellow gown belongings secures prior to receiving. Pt presents to ED admitting to ETOH use prior to arrival. Pt sleeping but responds to verbal stimuli, states he drinks 1 pint pf vodka daily. Last drink was this AM. Denies headache, SI/VH/AH. Respirations even & unlabored. NAD.

## 2025-02-21 NOTE — ED ADULT NURSE NOTE - NSFALLHARMRISKINTERV_ED_ALL_ED
Assistance OOB with selected safe patient handling equipment if applicable/Assistance with ambulation/Communicate risk of Fall with Harm to all staff, patient, and family/Monitor gait and stability/Monitor for mental status changes and reorient to person, place, and time, as needed/Provide visual cue: red socks, yellow wristband, yellow gown, etc/Reinforce activity limits and safety measures with patient and family/Toileting schedule using arm’s reach rule for commode and bathroom/Use of alarms - bed, stretcher, chair and/or video monitoring/Bed in lowest position, wheels locked, appropriate side rails in place/Call bell, personal items and telephone in reach/Instruct patient to call for assistance before getting out of bed/chair/stretcher/Non-slip footwear applied when patient is off stretcher/[place] to call system/Physically safe environment - no spills, clutter or unnecessary equipment/Purposeful Proactive Rounding/Room/bathroom lighting operational, light cord in reach

## 2025-02-22 PROBLEM — F10.10 ALCOHOL ABUSE, UNCOMPLICATED: Chronic | Status: ACTIVE | Noted: 2024-12-15

## 2025-02-22 PROCEDURE — 70450 CT HEAD/BRAIN W/O DYE: CPT | Mod: 26,59

## 2025-02-22 PROCEDURE — 80307 DRUG TEST PRSMV CHEM ANLYZR: CPT

## 2025-02-22 PROCEDURE — 70498 CT ANGIOGRAPHY NECK: CPT | Mod: 26

## 2025-02-22 PROCEDURE — 85610 PROTHROMBIN TIME: CPT

## 2025-02-22 PROCEDURE — 80053 COMPREHEN METABOLIC PANEL: CPT

## 2025-02-22 PROCEDURE — 70496 CT ANGIOGRAPHY HEAD: CPT | Mod: 26

## 2025-02-22 PROCEDURE — 85027 COMPLETE CBC AUTOMATED: CPT

## 2025-02-22 PROCEDURE — 99285 EMERGENCY DEPT VISIT HI MDM: CPT | Mod: 25

## 2025-02-22 PROCEDURE — 70496 CT ANGIOGRAPHY HEAD: CPT | Mod: MC

## 2025-02-22 PROCEDURE — 36415 COLL VENOUS BLD VENIPUNCTURE: CPT

## 2025-02-22 PROCEDURE — 70450 CT HEAD/BRAIN W/O DYE: CPT | Mod: MC

## 2025-02-22 PROCEDURE — 70498 CT ANGIOGRAPHY NECK: CPT | Mod: MC

## 2025-02-22 PROCEDURE — 85730 THROMBOPLASTIN TIME PARTIAL: CPT

## 2025-02-22 NOTE — ED ADULT NURSE REASSESSMENT NOTE - NS ED NURSE REASSESS COMMENT FT1
Assuming care from RN Indu Mo and Desi Miramontes, plan of care, reason for hospital visit were reviewed, introduced to patient, updated patient on plan of care. Education deemed successful after teach back shows proficiency, VS recorded in the EMR. PT is alert and oriented, with a patent and self maintained airway, with non labored breathing. PT denies CP, SOB, HA, N/V/D, Fevers or chills. Pt awaiting for CT scan, pt maintained in yellow gown.

## 2025-02-23 ENCOUNTER — EMERGENCY (EMERGENCY)
Facility: HOSPITAL | Age: 65
LOS: 1 days | Discharge: HOME | End: 2025-02-23
Attending: STUDENT IN AN ORGANIZED HEALTH CARE EDUCATION/TRAINING PROGRAM
Payer: COMMERCIAL

## 2025-02-23 VITALS
OXYGEN SATURATION: 95 % | HEART RATE: 87 BPM | RESPIRATION RATE: 20 BRPM | DIASTOLIC BLOOD PRESSURE: 123 MMHG | SYSTOLIC BLOOD PRESSURE: 198 MMHG | HEIGHT: 72 IN | WEIGHT: 220.02 LBS | TEMPERATURE: 97 F

## 2025-02-23 DIAGNOSIS — Z98.890 OTHER SPECIFIED POSTPROCEDURAL STATES: Chronic | ICD-10-CM

## 2025-02-23 PROCEDURE — 99284 EMERGENCY DEPT VISIT MOD MDM: CPT

## 2025-02-23 RX ORDER — LOSARTAN POTASSIUM 100 MG/1
25 TABLET, FILM COATED ORAL DAILY
Refills: 0 | Status: DISCONTINUED | OUTPATIENT
Start: 2025-02-23 | End: 2025-03-03

## 2025-02-23 RX ORDER — IBUPROFEN 200 MG
600 TABLET ORAL ONCE
Refills: 0 | Status: COMPLETED | OUTPATIENT
Start: 2025-02-23 | End: 2025-02-23

## 2025-02-23 RX ORDER — AMLODIPINE BESYLATE 10 MG/1
10 TABLET ORAL ONCE
Refills: 0 | Status: COMPLETED | OUTPATIENT
Start: 2025-02-23 | End: 2025-02-23

## 2025-02-23 RX ADMIN — AMLODIPINE BESYLATE 10 MILLIGRAM(S): 10 TABLET ORAL at 19:49

## 2025-02-23 RX ADMIN — LOSARTAN POTASSIUM 25 MILLIGRAM(S): 100 TABLET, FILM COATED ORAL at 19:49

## 2025-02-23 RX ADMIN — Medication 600 MILLIGRAM(S): at 21:40
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
## 2025-02-23 NOTE — ED PROVIDER NOTE - MDM ORDERS SUBMITTED SELECTION
Pt received recall letter per Ingrid in [place] for 6 mon f/u with ct of chest. Pt verified phone number, address, and insurance 2/16/2018    Not Applicable

## 2025-02-23 NOTE — ED PROVIDER NOTE - CLINICAL SUMMARY MEDICAL DECISION MAKING FREE TEXT BOX
64 year old male pt presents to the ED for alcohol intox. In the ED, vitals stable, A& O x 3, NAD, admits to alcohol use. No withdrawal symptoms, physical exam unremarkable. pt requesting to rest till sober. Will monitor for sobriety and reasses 64 year old male pt presents to the ED for alcohol intox. In the ED, vitals stable, A& O x 3, NAD, admits to alcohol use. No withdrawal symptoms, physical exam unremarkable. pt requesting to rest until sober. Will monitor for sobriety and re-asses 64 year old male pt presents to the ED for alcohol intox. In the ED, vitals stable, A& O x 3, NAD, admits to alcohol use. No withdrawal symptoms, physical exam unremarkable. pt requesting to rest until sober. Will monitor for sobriety and re-asses    Patient was awake, alert, and oriented. Currently sleeping. When awake patient should be stable for discharge.
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
## 2025-02-23 NOTE — ED ADULT NURSE NOTE - OBJECTIVE STATEMENT
pt comes into ED ETOH stating that he is "sorry". pt states that he drank a pint of vodka earlier today and drinks almost every day. pt poor historian. pt has no complaints of pain or discomfort at this time, breathing even and unlabored on room air.

## 2025-02-23 NOTE — ED ADULT TRIAGE NOTE - CHIEF COMPLAINT QUOTE
[place]. pt states he drank a lot today and is sorry. denies complaints. pt hypertensive reports not taking his medication

## 2025-02-23 NOTE — ED PROVIDER NOTE - NSFOLLOWUPINSTRUCTIONS_ED_ALL_ED_FT
Alcohol Intoxication    Alcohol intoxication occurs when a person no longer thinks clearly or functions well (becomes impaired) after drinking alcohol. Intoxication can occur with just one drink. The legal definition of alcohol intoxication depends on the amount of alcohol in the blood (blood alcohol concentration, HAKEEM). HAKEEM of 80–100 mg/dL or higher is commonly considered legally intoxicated. The level of impairment depends on:    The amount of alcohol the person had.  The person's age, gender, and weight.  How often the person drinks.  Whether the person has other medical conditions, such as diabetes, seizures, or a heart condition.    Alcohol intoxication can range from mild to severe. The condition can be dangerous, especially if the person:    Also took certain drugs or prescription medicines.  Drinks a large amount of alcohol in a short period of time (binge drinks).    For women, binge drinking is having four or more drinks at one time.  For men, binge drinking is having five or more drinks at one time.    If you or anyone around you appears intoxicated, speak up and act.    What are the causes?  This condition is caused by drinking alcohol.    What increases the risk?  The following factors may make you more likely to develop this condition:    Peer pressure in young adults.  Difficulty managing stress.  History of drug or alcohol abuse.  Combining alcohol with drugs.  Family history of drug or alcohol abuse.  Low body weight.  Binge drinking.    What are the signs or symptoms?  Symptoms of alcohol intoxication can vary from person to person. Symptoms can be mild, moderate, or severe.    Symptoms of mild alcohol intoxication may include:    Feeling relaxed or sleepy.  Having mild difficulty with coordination, speech, memory, or attention.    Symptoms of moderate alcohol intoxication may include:    Extreme emotions, like anger or sadness.  Moderate difficulty with coordination, speech, memory, or attention.    Symptoms of severe alcohol intoxication may include:    Severe difficulty with coordination, speech, memory, or attention.  Passing out.  Vomiting.  Confusion.  Slow breathing.  Coma.    Intoxication can change quickly from mild to severe. It can cause coma or death, especially in people who are not exposed to alcohol often.    How is this diagnosed?  Your health care provider will ask you how much alcohol you drank and what kind you had. Intoxication may also be diagnosed based on:    Your symptoms and medical history.  A physical exam.  A blood test that measures HAKEEM.  A smell of alcohol on your breath.    How is this treated?  Treatment for alcohol intoxication may include:    Being monitored in an emergency department, hospital, or treatment center until your HAKEEM comes down and it is safe for you to go home.  IV fluids to prevent or treat loss of fluid in the body (dehydration).  Medicine to treat nausea or vomiting or to get rid of alcohol in the body.  Counseling (brief intervention) about the dangers of using alcohol.  Treatment for substance use disorder.  Oxygen therapy or a breathing machine (ventilator).    Long-term (chronic) exposure to alcohol can have long-term effects on your brain, heart, and gastrointestinal system. These effects can be serious and may also require treatment.    Follow these instructions at home:         Eating and drinking     Do not drink alcohol if:    Your health care provider tells you not to drink.  You are pregnant, may be pregnant, or are planning to become pregnant.  You are under the legal drinking age (21 years old in the [place]  You are taking medicines that should not be taken with alcohol.  You have a medical condition, and alcohol makes it worse.  You need to drive or perform activities that require you to be alert.  You have substance use disorder.  Ask your health care provider if alcohol is safe for you. If your health care provider allows you to drink alcohol, limit how much you have. You may drink:    0–1 drink a day for women.   0–2 drinks a day for men.     Be aware of how much alcohol is in your drink. In the [place] one drink equals one 12 oz bottle of beer (355 mL), one 5 oz glass of wine (148 mL), or one 1½ oz shot of hard liquor (44 mL).  Avoid drinking alcohol on an empty stomach.  Stay hydrated. Drink enough fluid to keep your urine pale yellow. Avoid caffeine because it can dehydrate you.  Avoid drinking more than one drink per hour.  When having multiple drinks, drink water or a non-alcoholic beverage between alcoholic drinks.        General instructions    Take over-the-counter and prescription medicines only as told by your health care provider.  Do not drive after drinking any amount of alcohol. Plan for a designated  or another way to go home.  Have someone responsible stay with you while you are intoxicated. You should not be left alone.  Keep all follow-up visits as told by your health care provider. This is important.    Contact a health care provider if:  You do not feel better after a few days.  You have problems at work, at school, or at home due to drinking.    Get help right away if:  You have any of the following:    Moderate to severe trouble with coordination, speech, memory, or attention.  Trouble staying awake.  Severe confusion.  A seizure.  Light-headedness.  Fainting.  Vomiting bright red blood or material that looks like coffee grounds.  Bloody stool (feces). The blood may make your stool bright red, black, or tarry. It may also smell bad.  Shakiness when trying to stop drinking.  Thoughts about hurting yourself or others.    If you ever feel like you may hurt yourself or others, or have thoughts about taking your own life, get help right away. You can go to your nearest emergency department or call:    Your local emergency services (911 in the [place]  A suicide crisis helpline, such as the National Suicide Prevention Lifeline at 1-440.663.4236. This is open 24 hours a day.    Summary  Alcohol intoxication occurs when a person no longer thinks clearly or functions well after drinking alcohol.  If your health care provider says that alcohol is safe for you, limit alcohol intake to no more than 1 drink a day for women (no drinks if you are pregnant) and 2 drinks a day for men. One drink equals 12 oz of beer, 5 oz of wine, or 1½ oz of hard liquor.  Contact your health care provider if drinking has caused you problems at work, school, or home.  Get help right away if you have thoughts about hurting yourself or others.    ADDITIONAL NOTES AND INSTRUCTIONS    Please follow up with your Primary MD in 24-48 hr.  Seek immediate medical care for any new/worsening signs or symptoms.

## 2025-02-23 NOTE — ED ADULT NURSE NOTE - TEMPLATE
Mirna Giron  [address] [facility] [address]      11/12/2024    Dear Mirna Giron     We have made several attempts to contact you by telephone, but have been unable to do so.       Please call the clinic at your earliest convenience to discuss incoming call received on 11/8/24.      Sincerely,     Dr. Palmer Ventura  [facility] MEDICAL OFFICE [facility] INTERNAL MEDICINE-[facility] [address]  [address]  [address]  [facility] [address]  [phone]    
General

## 2025-02-23 NOTE — ED PROVIDER NOTE - PHYSICAL EXAMINATION
Generalt: Pt is well appearing. In no acute distress.   HEENT: Oropharynx clear, Moist mucous membranes. Head is atraumatic   Eyes: PERRL  Neck:. Trachea midline  Cardiac: Regular rate and regular rhythm. +S1/S2. No murmurs, rubs or gallops.  Resp: Speaking in full sentences, breath sounds equal and clear bilaterally. No wheezes, rales or rhonchi.  Abd: Soft, non-tender, non-distended.  No guarding or rebound.  MSK: extremities grossly normal, no signs of external trauma  Skin: No rashes, abrasions or lacerations.  Neuro: Moves all extremities symmetrically. No motor or sensory deficits

## 2025-02-23 NOTE — ED PROVIDER NOTE - PATIENT PORTAL LINK FT
You can access the FollowMyHealth Patient Portal offered by [facility] by registering at the following website: [email or web address]. By joining [employer]’s FollowMyHealth portal, you will also be able to view your health information using other applications (apps) compatible with our system.

## 2025-02-24 VITALS
SYSTOLIC BLOOD PRESSURE: 149 MMHG | HEART RATE: 91 BPM | TEMPERATURE: 98 F | DIASTOLIC BLOOD PRESSURE: 91 MMHG | OXYGEN SATURATION: 94 % | RESPIRATION RATE: 18 BRPM

## 2025-02-24 PROCEDURE — 82962 GLUCOSE BLOOD TEST: CPT

## 2025-02-24 PROCEDURE — 99285 EMERGENCY DEPT VISIT HI MDM: CPT

## 2025-02-24 RX ORDER — CHLORDIAZEPOXIDE HCL 10 MG
50 CAPSULE ORAL ONCE
Refills: 0 | Status: DISCONTINUED | OUTPATIENT
Start: 2025-02-24 | End: 2025-02-24

## 2025-02-24 RX ADMIN — Medication 50 MILLIGRAM(S): at 02:06

## 2025-02-26 ENCOUNTER — EMERGENCY (EMERGENCY)
Facility: HOSPITAL | Age: 65
LOS: 1 days | Discharge: HOME | End: 2025-02-26
Attending: STUDENT IN AN ORGANIZED HEALTH CARE EDUCATION/TRAINING PROGRAM
Payer: COMMERCIAL

## 2025-02-26 VITALS
DIASTOLIC BLOOD PRESSURE: 91 MMHG | SYSTOLIC BLOOD PRESSURE: 142 MMHG | HEART RATE: 76 BPM | TEMPERATURE: 98 F | RESPIRATION RATE: 16 BRPM | OXYGEN SATURATION: 95 % | HEIGHT: 72 IN | WEIGHT: 229.94 LBS

## 2025-02-26 VITALS
DIASTOLIC BLOOD PRESSURE: 78 MMHG | RESPIRATION RATE: 18 BRPM | TEMPERATURE: 98 F | SYSTOLIC BLOOD PRESSURE: 128 MMHG | OXYGEN SATURATION: 96 % | HEART RATE: 74 BPM

## 2025-02-26 DIAGNOSIS — Z98.890 OTHER SPECIFIED POSTPROCEDURAL STATES: Chronic | ICD-10-CM

## 2025-02-26 PROCEDURE — 70450 CT HEAD/BRAIN W/O DYE: CPT | Mod: MC

## 2025-02-26 PROCEDURE — 72125 CT NECK SPINE W/O DYE: CPT | Mod: MC

## 2025-02-26 PROCEDURE — 72125 CT NECK SPINE W/O DYE: CPT | Mod: 26

## 2025-02-26 PROCEDURE — 99284 EMERGENCY DEPT VISIT MOD MDM: CPT

## 2025-02-26 PROCEDURE — 70450 CT HEAD/BRAIN W/O DYE: CPT | Mod: 26

## 2025-02-26 PROCEDURE — 99285 EMERGENCY DEPT VISIT HI MDM: CPT | Mod: 25

## 2025-02-26 PROCEDURE — 82962 GLUCOSE BLOOD TEST: CPT

## 2025-02-26 NOTE — ED ADULT NURSE NOTE - OBJECTIVE STATEMENT
assumed care of pt, aaox4 as etoh. pt changed into yellow gown, pending MD reassessment. safety precautions maintained, care remains ongoing.

## 2025-02-26 NOTE — ED PROVIDER NOTE - OBJECTIVE STATEMENT
64-year-old male past medical history of hypertension alcohol misuse cerebral aneurysm status post presents to the ED after falling in bushes.  Patient states he normally drinks vodka today drinking 1 bottle of vodka.  Patient is states he does not know why he is here.  Does not know why anyone called the police to bring him to the hospital.  Patient states he is not in no pain.  No nausea no vomiting no shakes.  No hallucinations.  Able to ambulate.

## 2025-02-26 NOTE — ED ADULT TRIAGE NOTE - CHIEF COMPLAINT QUOTE
BIBA for intoxication and fall into bush. Pt admits to drinking an unspecified amount of beer and vodka. Presents with multiple scrapes to face. Pt changed into yellow gown.

## 2025-02-26 NOTE — ED PROVIDER NOTE - PHYSICAL EXAMINATION
Gen: older gentelman with dried blood over left hand and right ear without any lacerations, no acute distress  Head: normocephalic, atraumatic  EENT: EOMI, moist mucous membranes, no scleral icterus   Lung: no increased work of breathing, clear to auscultation bilaterally, no wheezing, rales, rhonchi, speaking in full sentences  CV: regular rate, regular rhythm, normal s1/s2, 2+ radial pulses bilaterally  Abd: soft, non-tender, non-distended,    MSK: No edema, no visible deformities, full range of motion in all 4 extremities  Neuro: Awake, alert, no focal neurologic deficits, non-tremulous  Skin: No obvious rash, no jaundice  Psych: normal affect, normal speech, no hallucinations

## 2025-02-26 NOTE — ED PROVIDER NOTE - NSFOLLOWUPINSTRUCTIONS_ED_ALL_ED_FT
Please follow-up with your primary care doctor in 24 to 48 hours.  Please return to the emergency department for shaking tremors seizures thoughts of hurting yourself or others, hearing or seeing things that others do not or if you get worse in any way    Please follow-up with a spine doctor regarding the chronic findings on your CAT scan of your neck.  Please see below to make an appointment      Alcohol Intoxication    WHAT YOU NEED TO KNOW:    What is alcohol intoxication? Alcohol intoxication is a harmful physical condition caused when you drink more alcohol than your body can handle. It is also called ethanol poisoning, or being drunk.    What do I need to know about recommended alcohol limits?    Men 21 to 64 years should limit alcohol to 2 drinks a day. Do not have more than 4 drinks in 1 day or more than 14 in 1 week.    All women, and men 65 or older should limit alcohol to 1 drink in a day. Do not have more than 3 drinks in 1 day or more than 7 in 1 week. No amount of alcohol is okay during pregnancy.  What are common signs and symptoms of alcohol intoxication?    Breath that smells like alcohol    Blackouts or seizures    Enlarged pupils, or eye movements that are faster than normal for you    Fast heartbeat and slow breaths    Loss of balance, or no ability to walk straight or stand still    Nausea and vomiting    Slurred or loud speech    Quick mood changes    Trouble at work or school, or risky behavior, such as unprotected sex or driving while intoxicated  How is alcohol intoxication treated? Your healthcare provider will ask about your use of alcohol. These questions may include how much, how often, and what kind of alcohol you drink. He or she may test your memory. Blood or urine samples may be tested for alcohol and for signs of liver, kidney, or heart damage. Treatment may include any of the following:    Medicines may be given to help you stay calm, control seizures, or prevent nausea and vomiting. You may also be given glucose or vitamin B1 if your levels are too low.    In brief intervention therapy, a healthcare provider helps you think about your alcohol use differently. He or she helps you set goals to decrease the amount of alcohol you drink. Therapy may continue after you leave the hospital.    Extra oxygen may be given if you are so intoxicated that you cannot breathe well on your own.  Where can I find more information?    Alcoholics Anonymous  Web Address: http://www.aa.org  Substance Abuse and Mental Health Services Administration (SAMA)  [address]  [place] [address]  Web Address: http://www.[facility].gov or https://d[facility]2.[facility].gov/treatment/  Call your local emergency number (911 in the [place]) if:    You have sudden trouble breathing or chest pain.    You have a seizure.    You feel sad enough to harm yourself or others.  When should I call my doctor?    You have hallucinations (you see or hear things that are not real).    You cannot stop vomiting.    You have questions or concerns about your condition or care.  CARE AGREEMENT:    You have the right to help plan your care. Learn about your health condition and how it may be treated. Discuss treatment options with your healthcare providers to decide what care you want to receive. You always have the right to refuse treatment.

## 2025-02-26 NOTE — ED ADULT NURSE NOTE - NSFALLRISKINTERV_ED_ALL_ED
Assistance OOB with selected safe patient handling equipment if applicable/Assistance with ambulation/Communicate fall risk and risk factors to all staff, patient, and family/Monitor gait and stability/Monitor for mental status changes and reorient to person, place, and time, as needed/Provide visual cue: yellow wristband, yellow gown, etc/Reinforce activity limits and safety measures with patient and family/Toileting schedule using arm’s reach rule for commode and bathroom/Use of alarms - bed, stretcher, chair and/or video monitoring/Call bell, personal items and telephone in reach/Instruct patient to call for assistance before getting out of bed/chair/stretcher/Non-slip footwear applied when patient is off stretcher/[place] to call system/Physically safe environment - no spills, clutter or unnecessary equipment/Purposeful Proactive Rounding/Room/bathroom lighting operational, light cord in reach

## 2025-02-26 NOTE — SBIRT NOTE ADULT - NSSBIRTDRGPOSREINDET_GEN_A_CORE
Provided SBIRT services: Full screen Negative. Positive reinforcement provided given patient currently within healthy guidelines. Education materials reviewed and given to patient.  Audit Score:  DAST Score:  Duration = # Minutes

## 2025-02-26 NOTE — ED PROVIDER NOTE - PROGRESS NOTE DETAILS
Patient reassessed at this time.  Wounds cleansed, no wounds requiring laceration repair, only abrasions.  Patient sitting up, tolerating p.o. Patient ambulating with steady gait.  Appears clinically sober at this time and ready to be discharged.  Will call Medicaid cab

## 2025-02-26 NOTE — ED PROVIDER NOTE - ATTENDING CONTRIBUTION TO CARE
65 yo M pmh HTN, alcohol misuse, 2 mm cerebral aneurysm presents with fall during intoxication. patient states he drank 1 pint of vodka today, drinks 1 pint a couple of times per week. Reportedly fell into a bush. Was trying to get up when someone helped him. No reported alcohol withdrawal history. States he lives in [facility] in an apartment with roomates, has a cab that can pick him up when he is sober, states there is no family to call for him right now. no HA    PHYSICAL EXAM:   General: appears intoxicated, odor of alcohol  HEENT: NC/AT, pupils equal but sluggish b/l,  airway patent, abrasions to R face no active bleeding, no c spine TTP  Cardiovascular: regular rate   Respiratory:  nonlabored respirations, saturating well on RA, breath sounds present and equal b/l  Abdominal: soft, nontender, nondistended, no rebound, guarding or rigidity  Back: no midline spinal TTP  Extremities: dried blood to L hand, no focal TTP to extremities, no gross deformities  Neuro: Alert and oriented x3. Moving all extremities.  Skin: as above  -Maranda Tobin MD Attending Physician     will CT head given trauma and aneruysm hx, no other trauma noted on exam, will check FS, monitor for sobriety. patient delicning  services at this time

## 2025-02-26 NOTE — ED PROVIDER NOTE - CARE PROVIDER_API CALL
Angelo Leyva  Neurosurgery  [address] [place] [address]  Phone: [phone]  Fax: [phone]  Follow Up Time: Routine

## 2025-02-26 NOTE — ED PROVIDER NOTE - PATIENT PORTAL LINK FT
You can access the FollowMyHealth Patient Portal offered by [facility] by registering at the following website: [email or web address]. By joining [employer]’s FollowMyHealth portal, you will also be able to view your health information using other applications (apps) compatible with our system.

## 2025-02-26 NOTE — ED PROVIDER NOTE - CLINICAL SUMMARY MEDICAL DECISION MAKING FREE TEXT BOX
64-year-old male past medical history of hypertension alcohol misuse cerebral aneurysm status post presents to the ED after falling in bushes who is nontremulous with CIWA=1. hemodynamically stable and saturating well on room air.

## 2025-03-19 ENCOUNTER — EMERGENCY (EMERGENCY)
Facility: HOSPITAL | Age: 65
LOS: 1 days | Discharge: HOME | End: 2025-03-19
Attending: STUDENT IN AN ORGANIZED HEALTH CARE EDUCATION/TRAINING PROGRAM
Payer: COMMERCIAL

## 2025-03-19 VITALS
WEIGHT: 187.39 LBS | DIASTOLIC BLOOD PRESSURE: 79 MMHG | OXYGEN SATURATION: 99 % | HEART RATE: 80 BPM | HEIGHT: 72 IN | TEMPERATURE: 97 F | SYSTOLIC BLOOD PRESSURE: 129 MMHG | RESPIRATION RATE: 18 BRPM

## 2025-03-19 DIAGNOSIS — Z98.890 OTHER SPECIFIED POSTPROCEDURAL STATES: Chronic | ICD-10-CM

## 2025-03-19 PROCEDURE — 99283 EMERGENCY DEPT VISIT LOW MDM: CPT

## 2025-03-19 PROCEDURE — 99285 EMERGENCY DEPT VISIT HI MDM: CPT

## 2025-03-19 PROCEDURE — 82962 GLUCOSE BLOOD TEST: CPT

## 2025-03-19 NOTE — ED ADULT TRIAGE NOTE - CHIEF COMPLAINT QUOTE
pt biba for alcohol intox, was at [facility] for hypertensive crisis, on the way home from hospital and got drunk before reaching home.

## 2025-03-20 ENCOUNTER — EMERGENCY (EMERGENCY)
Facility: HOSPITAL | Age: 65
LOS: 1 days | Discharge: HOME | End: 2025-03-20
Attending: EMERGENCY MEDICINE
Payer: COMMERCIAL

## 2025-03-20 VITALS
DIASTOLIC BLOOD PRESSURE: 80 MMHG | SYSTOLIC BLOOD PRESSURE: 132 MMHG | OXYGEN SATURATION: 95 % | HEART RATE: 95 BPM | TEMPERATURE: 98 F | RESPIRATION RATE: 20 BRPM

## 2025-03-20 VITALS
TEMPERATURE: 99 F | OXYGEN SATURATION: 97 % | RESPIRATION RATE: 18 BRPM | HEART RATE: 75 BPM | SYSTOLIC BLOOD PRESSURE: 128 MMHG | DIASTOLIC BLOOD PRESSURE: 70 MMHG

## 2025-03-20 VITALS — TEMPERATURE: 93 F

## 2025-03-20 DIAGNOSIS — Z98.890 OTHER SPECIFIED POSTPROCEDURAL STATES: Chronic | ICD-10-CM

## 2025-03-20 PROCEDURE — 99283 EMERGENCY DEPT VISIT LOW MDM: CPT

## 2025-03-20 PROCEDURE — 99285 EMERGENCY DEPT VISIT HI MDM: CPT

## 2025-03-20 PROCEDURE — 82962 GLUCOSE BLOOD TEST: CPT

## 2025-03-20 NOTE — ED PROVIDER NOTE - ATTENDING CONTRIBUTION TO CARE
I, Kimo Fournier MD, personally saw the patient with the resident, and completed the key components of the history and physical exam. I then discussed the management plan with the resident.  Patient with a past medical history of hypertension, alcohol use disorder, cerebral aneurysm is presenting with concern for intoxication.  [place].  His brothers brought him to the hospital as they are concerned for his intoxication.  No other reported trauma or other complaints.  Of note, patient was in this ER this morning, discharged around 6 AM for similar presentation.  On physical exam here patient is mumbling but able to answer questions appropriately with yes and no and stating he is in the hospital.  Follows commands in all extremities.  Has old abrasions over his extremities as well as his forehead with no new apparent wounds.  No bruising seen to back.  Patient found to have rectal temperature of 93.3, likely environmental as he was outside in the cold for the past few hours.  Patient's history and exam otherwise consistent with alcohol intoxication.  Without reported or obvious head trauma, will hold off on CT head at this time.  With concern for hypothermia from environmental exposure, we will plan to change into dry close, placed warm blankets on patient and recheck temperature.  Patient's mental status begins to improve as well as he begins to get warmer, will hold off on further workup.  If he maintains hypothermia, then would consider adding on lab work for more infectious workup.  However at this time his symptoms appear to be consistent with alcohol intoxication and environmental exposure.

## 2025-03-20 NOTE — ED ADULT NURSE NOTE - OBJECTIVE STATEMENT
Pt presents to ED c/o intoxication. Pt admits to drinking alcohol. Denies any pain, headaches, N/V/D. Pt denies any falls/trauma. Pt updated on the plan of care and verbalizes understanding.

## 2025-03-20 NOTE — ED PROVIDER NOTE - PHYSICAL EXAMINATION
Constitutional: Awake, alert, in no acute distress  Eyes: no scleral icterus  HENT: normocephalic, +old appearing laceration to forehead with steri strip in place, moist oral mucosa  Neck: supple  CV: RRR, no murmur  Pulm: non-labored respirations, CTAB  Abdomen: soft, non-tender, non-distended  Extremities: no edema, no deformity  Skin: no rash, no jaundice  Neuro: AAOx3, moving all extremities equally

## 2025-03-20 NOTE — ED PROVIDER NOTE - NSFOLLOWUPINSTRUCTIONS_ED_ALL_ED_FT
Alcohol Intoxication  Alcohol intoxication occurs when a person no longer thinks clearly or functions well after drinking alcohol. This is also referred to as becoming impaired. Intoxication can occur with just one drink. The legal definition of alcohol intoxication depends on the amount of alcohol in the blood (blood alcohol concentration, HAKEEM). HAKEEM of 80–100 mg/dL or higher is commonly considered legally intoxicated. The level of impairment depends on:  The amount of alcohol the person had.  The person's age, gender, and weight.  How often the person drinks.  Whether the person has other medical conditions, such as diabetes, seizures, or a heart condition.  Alcohol intoxication can range from mild to severe. The condition can be dangerous, especially if the person:  Also took certain drugs or prescription medicines.  Drinks a large amount of alcohol in a short period of time (binge drinks).  For women, binge drinking is having four or more drinks at one time.  For men, binge drinking is having five or more drinks at one time.  If you or anyone around you appears intoxicated, speak up and act.    What are the causes?  This condition is caused by drinking alcohol.    What increases the risk?  The following factors may make you more likely to develop this condition:  Peer pressure in young adults.  Difficulty managing stress.  History of drug or alcohol abuse.  Family history of drug or alcohol abuse.  Combining alcohol with drugs.  Low body weight.  Binge drinking.  What are the signs or symptoms?  Symptoms of alcohol intoxication can vary from person to person. Symptoms can be mild, moderate, or severe.    Symptoms of mild alcohol intoxication may include:  Feeling relaxed or sleepy.  Having mild difficulty with coordination, speech, memory, or attention.  Symptoms of moderate alcohol intoxication may include:  Strong anger or extreme sadness.  Moderate difficulty with coordination, speech, memory, or attention.  Symptoms of severe alcohol intoxication may include:  Severe difficulty with coordination, speech, memory, or attention.  Passing out.  Vomiting.  Confusion.  Slow breathing.  Coma.  Intoxication can change quickly from mild to severe. It can cause coma or death, especially in people who do not drink alcohol often.    How is this diagnosed?  Your health care provider will ask you how much alcohol you drank and what kind you had. Intoxication may also be diagnosed based on:  Your symptoms and medical history.  A physical exam.  A blood test that measures HAKEEM.  A smell of alcohol on your breath.  How is this treated?  Treatment for alcohol intoxication may include:  Being monitored in an emergency department, hospital, or treatment center until your HAKEEM comes down and it is safe for you to go home.  IV fluids to prevent or treat loss of fluid in the body (dehydration).  Medicine to treat nausea or vomiting or to get rid of alcohol in the body.  Counseling about the dangers of using alcohol.  Treatment for substance use disorder.  Oxygen therapy or a breathing machine (ventilator).  Drinking alcohol for a long time can have long-term effects on your brain, heart, and digestive system. These effects can be serious and may also require treatment.    Follow these instructions at home:  A sign showing that a person should not drive.  Eating and drinking    A 12-ounce bottle of beer, a 5-ounce glass of wine, and a 1.5-ounce shot of hard liquor.  Do not drink alcohol if:  Your health care provider tells you not to drink.  You are pregnant, may be pregnant, or are planning to become pregnant.  You are under the legal drinking age, or under 21 years old in the [place]  You are taking medicines that should not be taken with alcohol.  You have a medical condition, and alcohol makes it worse.  You need to drive or perform activities that require you to be alert.  You have substance use disorder.  Ask your health care provider if alcohol is safe for you. If your health care provider allows you to drink alcohol, limit how much you have to:  0–1 drink a day for women who are not pregnant.  0–2 drinks a day for men.  Know how much alcohol is in your drink. In the [place] one drink equals one 12 oz bottle of beer (355 mL), one 5 oz glass of wine (148 mL), or one 1½ oz glass of hard liquor (44 mL).  Avoid drinking alcohol on an empty stomach.  Alcohol increases urination. It is important to stay hydrated and avoid caffeine.  Avoid drinking more than one drink per hour.  When having multiple drinks, drink water or a non-alcoholic beverage between alcoholic drinks.  General instructions    Take over-the-counter and prescription medicines only as told by your health care provider.  Do not drive after drinking any amount of alcohol. Plan for a designated  or another way to go home.  Have someone responsible stay with you while you are intoxicated. You should notbe left alone.  Contact a health care provider if:  You do not feel better after a few days.  You have problems at work, at school, or at home due to drinking.  Get help right away if:  You have any of the following:  Trouble staying awake.  Moderate to severe trouble with coordination, speech, memory, or attention.  You are told you may have had a seizure.  Vomiting bright red blood or material that looks like coffee grounds.  Bloody stool (feces). The blood may make your stool bright red, black, or tarry.  These symptoms may be an emergency. Get help right away. Call 911.  Do not wait to see if the symptoms will go away.  Do not drive yourself to the hospital.  Also, get help right away if:  You have thoughts about hurting yourself or others.  Take one of these steps if you feel like you may hurt yourself or others, or have thoughts about taking your own life:  Call 911.  Call the National Suicide Prevention Lifeline at 1-558.539.9624 or 903. This is open 24 hours a day.  Text the Crisis Text Line at 314169.  Summary  Alcohol intoxication occurs when a person no longer thinks clearly or functions well after drinking alcohol.  Ask your health care provider if alcohol is safe for you. If your health care provider allows you to drink alcohol, limit how much you have.  Contact your health care provider if drinking has caused you problems at work, school, or home.  Get help right away if you have thoughts about hurting yourself or others.  This information is not intended to replace advice given to you by your health care provider. Make sure you discuss any questions you have with your health care provider.

## 2025-03-20 NOTE — ED PROVIDER NOTE - PATIENT PORTAL LINK FT
You can access the FollowMyHealth Patient Portal offered by [facility] by registering at the following website: [email or web address]. By joining [employer]’s FollowMyHealth portal, you will also be able to view your health information using other applications (apps) compatible with our system.

## 2025-03-20 NOTE — ED ADULT NURSE REASSESSMENT NOTE - NS ED NURSE REASSESS COMMENT FT1
Pt normothermic at this time and alert, responsive to verbal stimuli, reported oral temp to MD rivera

## 2025-03-20 NOTE — ED ADULT NURSE NOTE - NSFALLHARMRISKINTERV_ED_ALL_ED

## 2025-03-20 NOTE — ED ADULT NURSE NOTE - NS TRANSFER PATIENT BELONGINGS
boots, pants, belt, tshirt, long sleeve shirt, cell phone, wallet/Cell Phone/PDA (specify)/Other belongings/Clothing

## 2025-03-20 NOTE — ED ADULT NURSE NOTE - OBJECTIVE STATEMENT
Received patient in critical-1. Pt awake alert bib brother through walk in triage by brother. [place] Pt mumbling words, disoriented to time place and situation. Pt noted to be hypothermic, MD Buckley and MD heredia at bedside

## 2025-03-20 NOTE — ED PROVIDER NOTE - OBJECTIVE STATEMENT
64y M w/ hx HTN, cerebral aneurysm, ETOH abuse; presents for intoxication. Pt BIBEMS after being found outside intoxicated. Pt admits to drinking alcohol. Denies recent falls. Has no complaints.

## 2025-03-20 NOTE — ED PROVIDER NOTE - OBJECTIVE STATEMENT
64 year old male with PMHx HTN, cerebral aneurysm, ETOH abuse brought in by brothers for alcohol intoxication. Per triage RN who spoke with brother, patient is intoxicated [place] Patient mumbling, difficult to understand, but able to state he drank a lot of vodka. No other complaints. Seen here earlier today for same.

## 2025-03-20 NOTE — ED PROVIDER NOTE - PROGRESS NOTE DETAILS
temperature rising appropriately, placed on abby hugger. Saw pt at bedside. Pt was more alert and conversant, but not fully coherent. Will continue to monitor for clinical sobriety. Yasmin: pt signed out to me earlier, has continued to improve mental status, reports drinking a lot today. denies trauma. normothermic. neuro grossly intact. lives in [facility] per pt. doesn't' want his brothers updated. Jewel MUNSON: Pt received in sign out, has improved mental status, ambulatory, conversive. Neuro grossly intact. provided address for medicaid cab transport. Transportation to pt's home to be set up, will discharge home. Pt denying detox at this time although resources offered.

## 2025-03-20 NOTE — ED PROVIDER NOTE - CLINICAL SUMMARY MEDICAL DECISION MAKING FREE TEXT BOX
64y M BIBEMS for alcohol intoxication. Pt in no distress with no signs of acute trauma. Will monitor for clinical sobriety. 64y M BIBEMS for alcohol intoxication. Pt in no distress with no signs of acute trauma. Will monitor for clinical sobriety.    Pt stable overnight. On reassessment, pt now AAOx4, ambulatory with steady gait, clinically sober. Stable for discharge.

## 2025-03-20 NOTE — ED PROVIDER NOTE - CLINICAL SUMMARY MEDICAL DECISION MAKING FREE TEXT BOX
64M hx etoh abuse, htn, cerebral aneurysm bib brothers [place] train station after found to be drinking alcohol. patient mumbling speech, but following all commands, moving extremities equally, states he drank a lot of vodka. core temp 93.3, however is otherwise hemodynamically stable. Temperature likely due to environmental exposure as patient found sitting at train station and temperature outside is 40s (F). Plan for monitoring until clinical sobriety.

## 2025-03-20 NOTE — ED PROVIDER NOTE - NSFOLLOWUPINSTRUCTIONS_ED_ALL_ED_FT
1. Return to ED for worsening, progressive or any other concerning symptoms   2. Follow up with your primary care doctor in 2-3days  3. Talk to your doctor about alcohol detoxification and resources that are available to you to help you quit    Alcohol Abuse    Alcohol intoxication occurs when the amount of alcohol that a person has consumed impairs his or her ability to mentally and physically function. Chronic alcohol consumption can also lead to a variety of health issues including neurological disease, stomach disease, heart disease, liver disease, etc. Do not drive after drinking alcohol. Drinking enough alcohol to end up in an Emergency Room suggests you may have an alcohol abuse problem. Seek help at a drug addiction center.    SEEK IMMEDIATE MEDICAL CARE IF YOU HAVE ANY OF THE FOLLOWING SYMPTOMS: seizures, vomiting blood, blood in your stool, lightheadedness/dizziness, or becoming shaky to tremulous when you stop drinking.

## 2025-03-20 NOTE — ED ADULT TRIAGE NOTE - ARRIVAL FROM
Home
Responsibility to children, family, or others/Identifies reasons for living/Supportive social network of family or friends
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
## 2025-03-20 NOTE — ED PROVIDER NOTE - PHYSICAL EXAMINATION
Gen: well nourished male in no apparent distress  Head: normocephalic, few scattered healing lacerations to face. no periorbital/periauricular ecchymosis  EENT: EOMI, pupils 2mm b/l and equal, moist mucous membranes  Lung: no increased work of breathing, clear to auscultation bilaterally, speaking in full sentences without distress  CV: regular rate, regular rhythm, normal s1/s2, 2+ radial pulses bilaterally  Abd: soft, non-tender, non-distended  MSK: No edema, no visible deformities, full range of motion in all 4 extremities  Neuro: Awake, alert, mumbling speech, following commands, no facial asymmetry, moving all extremities against gravity Gen: well nourished male in no apparent distress who appears disheveled  Head: normocephalic, few scattered healing lacerations to face. no periorbital/periauricular ecchymosis  EENT: EOMI, pupils 2mm b/l and equal, moist mucous membranes  Lung: no increased work of breathing, clear to auscultation bilaterally, speaking in full sentences without distress  CV: regular rate, regular rhythm, normal s1/s2, 2+ radial pulses bilaterally  Abd: soft, non-tender, non-distended  MSK: No edema, no visible deformities, full range of motion in all 4 extremities  Neuro: Awake, alert, mumbling speech, following commands, no facial asymmetry, moving all extremities against gravity

## 2025-03-20 NOTE — ED PROVIDER NOTE - PATIENT PORTAL LINK FT
You can access the FollowMyHealth Patient Portal offered by [facility] by registering at the following website: [email or web address]. By joining [employer]’s FollowMyHealth portal, you will also be able to view your health information using other applications (apps) compatible with our system.

## 2025-03-20 NOTE — ED ADULT TRIAGE NOTE - CHIEF COMPLAINT QUOTE
Pt brought in through walk in triage by his two brothers in wheelchair, as per one of his brothers states pt is intoxicated, is a daily drinker, found drinking at train station. Pt presents mumbling words, altered, disoriented, not making clear sentances. extremities cold to touch. Pt brought back to ambulance triage. Rectal temp recorded at 93.3F. Was here yesterday as well. MD Mendes and MD heredia at bedside. Alcohol odor noted on breath. Pt placed in yellow gown and belongings secured.

## 2025-03-20 NOTE — ED ADULT NURSE NOTE - CAS ELECT INFOMATION PROVIDED
Patient verbalizes understanding of and agrees with discharge instructions.  Ambulated off unit without incident./DC instructions

## 2025-03-21 DIAGNOSIS — I10 ESSENTIAL (PRIMARY) HYPERTENSION: ICD-10-CM

## 2025-03-21 DIAGNOSIS — F10.129 ALCOHOL ABUSE WITH INTOXICATION, UNSPECIFIED: ICD-10-CM
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
## 2025-03-28 ENCOUNTER — EMERGENCY (EMERGENCY)
Facility: HOSPITAL | Age: 65
LOS: 1 days | Discharge: HOME | End: 2025-03-28
Attending: EMERGENCY MEDICINE
Payer: COMMERCIAL

## 2025-03-28 VITALS
TEMPERATURE: 97 F | DIASTOLIC BLOOD PRESSURE: 90 MMHG | OXYGEN SATURATION: 94 % | SYSTOLIC BLOOD PRESSURE: 134 MMHG | RESPIRATION RATE: 19 BRPM | HEART RATE: 64 BPM

## 2025-03-28 NOTE — ED PROVIDER NOTE - PHYSICAL EXAMINATION
Gen:  NAD  Head: NC/AT  Neck: trachea midline, C-collar in place no midline c-spine ttp  Card: regular rate and rhythm, no chest wall ttp  Resp:  CTAB  Abd: soft, non-distended, non-tender  Ext: no deformities or tenderness, no ecchymoses  Neuro: Awake, alert. PATRICK spontaneously, EOMI, PERRL, no facial droop  Skin:  Warm and dry as visualized  Psych:  Normal affect and mood

## 2025-03-28 NOTE — ED PROVIDER NOTE - PATIENT PORTAL LINK FT
You can access the FollowMyHealth Patient Portal offered by [facility] by registering at the following website: [email or web address]. By joining [employer]’s FollowMyHealth portal, you will also be able to view your health information using other applications (apps) compatible with our system.

## 2025-03-28 NOTE — ED PROVIDER NOTE - OBJECTIVE STATEMENT
approx 65 M unknown PMHx BIBEMS from outside [facility]. pt endorses etoh. clinically intoxicated, denies pain or trauma. denies SI/HI.

## 2025-03-28 NOTE — ED PROVIDER NOTE - CLINICAL SUMMARY MEDICAL DECISION MAKING FREE TEXT BOX
approx 65 M unknown PMHx BIBEMS from outside [facility]. pt endorses etoh. clinically intoxicated, denies pain or trauma. denies SI/HI. CT head and cervical spine, observation for clinical sobriety. approx 65 M unknown PMHx BIBEMS from outside [facility]. pt endorses etoh. clinically intoxicated, denies pain or trauma. denies SI/HI. CT head and cervical spine, observation for clinical sobriety. Patient reassesed--no complaints.  Vital signs normal.  Resting comfortably.  A&Ox3.  Speech clear. Gait normal.  Clinically sober.

## 2025-03-28 NOTE — ED ADULT TRIAGE NOTE - CHIEF COMPLAINT QUOTE
Pt brought in by EMS found outside of [facility] donuts unresponsive. Pt A&Ox3 in triage. Pt has C collar placed by EMS. Pt has no complaints at this time. Finger stick to be obtained. Pt unable to recall if any medical history is presents. States had some ETOH.

## 2025-03-28 NOTE — ED PROVIDER NOTE - NSFOLLOWUPINSTRUCTIONS_ED_ALL_ED_FT
-Please follow-up with your primary care doctor in the next 2 days.  Please call tomorrow for an appointment.  If you cannot follow-up with your primary care doctor please return to the ED for any urgent issues.  - You were given a copy of the tests performed today.  Please bring the results with you and review them with your primary care doctor.  - If you have any worsening of symptoms or any other concerns please return to the ED immediately.  - Please continue taking your home medications as directed.    Alcohol Use Disorder    WHAT YOU NEED TO KNOW:    Alcohol use disorder (AUD) is problem drinking. AUD includes alcohol abuse and alcohol dependency.     DISCHARGE INSTRUCTIONS:    Seek care immediately if:     Your heart is beating faster than usual.      You have hallucinations.      You cannot remember what happens while you are drinking.      You have seizures.    Contact your healthcare provider if:     You are anxious and have nausea.      Your hands are shaky and you are sweating heavily.      You have questions or concerns about your condition or care.    Follow up with your healthcare provider as directed: Do not try to stop drinking on your own. Your healthcare provider may need to help you withdraw from alcohol safely. He may need to admit you to the hospital. You may also need any of the following treatments:    Medicines to decrease your craving for alcohol      Support groups such as Alcoholics Anonymous       Therapy from a psychiatrist or psychologist       Admission to an inpatient facility for treatment for severe AUD    Interested in discussing options to reduce your alcohol or drug use?      [facility]: [phone]   [facility] Substance Abuse Services: [phone], option #2   Methadone Maintenance & Ambulatory Opiate Detox: [phone]  [facility]: [phone]  [facility]: [phone]  [facility]: [phone]    [facility]: [phone], option #2   [facility]: [phone]    [facility]: [phone]    [facility] Central Intake: [phone]  [facility] Chemical Dependency/Ancillary Withdrawal: [phone]  [facility] Methadone Maintenance: [phone]    [facility]: [phone]  [facility] Addiction Treatment Services: [phone]    [facility]: 3-122-5-HOPENY    [facility] Office of Alcoholism and Substance Abuse Services (OASAS): https://www.oasas.[place].gov/providerdirectory/  [facility] for Addiction Services and Psychotherapy Interventions Research (CASPIR)  www.[facility].org     Interested in discussing options to reduce your tobacco use?    [facility] for Tobacco Control:  [phone]  [facility] QUITLINE: 6-502-YX-QUITS ([phone])    Interested in learning more about substance use?      http://rethinkingdrinking.niaaa.nih.gov   https://www.drugabuse.gov/patients-families     Learn more about opioid overdose prevention programs in [facility]:  http://www.health.[place].gov/diseases/aids/general/opioid_overdose_prevention/

## 2025-03-28 NOTE — ED PROVIDER NOTE - IV ALTEPLASE DOOR HIDDEN
Patient verbally informed that body search would be performed to  remove any items that may be potentially used for self harm or suicidal behavior, ensure that no potentially dangerous mind or mood altering drugs were being introduced into treatment environment, remove any items that may pose a risk for personal safety due to thought or mood disorder and advised about what would occur during the search process.  Patient denies being in possession of potentially dangerous items.  The patient was provided with an opportunity to ask questions or voice any concerns related to the body surface search prior to it being performed.  The patient agreed to participate in the search process.  Body surface search was conducted by two staff members: (Afia SAPP and Kinjal LUJAN). Metal detector wand used during body and belonging search, contraband was not found.  Patient response to search process was Uncooperative with no adverse physical or psychological response.  Contraband was not found during the search process.    show

## 2025-03-28 NOTE — ED ADULT NURSE NOTE - CHIEF COMPLAINT QUOTE
Pt brought in by EMS found outside of [facility] donuts unresponsive. Pt A&Ox3 in triage. Pt has C collar placed by EMS. Pt has no complaints at this time. Finger stick to be obtained. Pt unable to recall if any medical history is presents. States had some ETOH.
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
## 2025-03-29 VITALS
HEART RATE: 66 BPM | TEMPERATURE: 98 F | SYSTOLIC BLOOD PRESSURE: 126 MMHG | RESPIRATION RATE: 18 BRPM | DIASTOLIC BLOOD PRESSURE: 71 MMHG | OXYGEN SATURATION: 97 %
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
## 2025-04-19 ENCOUNTER — EMERGENCY (EMERGENCY)
Facility: HOSPITAL | Age: 65
LOS: 1 days | End: 2025-04-19
Attending: STUDENT IN AN ORGANIZED HEALTH CARE EDUCATION/TRAINING PROGRAM
Payer: COMMERCIAL

## 2025-04-19 VITALS
HEART RATE: 75 BPM | OXYGEN SATURATION: 98 % | TEMPERATURE: 98 F | RESPIRATION RATE: 18 BRPM | SYSTOLIC BLOOD PRESSURE: 121 MMHG | DIASTOLIC BLOOD PRESSURE: 77 MMHG

## 2025-04-19 VITALS
SYSTOLIC BLOOD PRESSURE: 114 MMHG | DIASTOLIC BLOOD PRESSURE: 78 MMHG | HEART RATE: 71 BPM | OXYGEN SATURATION: 96 % | RESPIRATION RATE: 16 BRPM | TEMPERATURE: 97 F

## 2025-04-19 DIAGNOSIS — Z98.890 OTHER SPECIFIED POSTPROCEDURAL STATES: Chronic | ICD-10-CM

## 2025-04-19 PROCEDURE — 70450 CT HEAD/BRAIN W/O DYE: CPT | Mod: 26

## 2025-04-19 PROCEDURE — 99284 EMERGENCY DEPT VISIT MOD MDM: CPT

## 2025-04-19 PROCEDURE — 99285 EMERGENCY DEPT VISIT HI MDM: CPT | Mod: 25

## 2025-04-19 PROCEDURE — 72125 CT NECK SPINE W/O DYE: CPT | Mod: MC

## 2025-04-19 PROCEDURE — 72125 CT NECK SPINE W/O DYE: CPT | Mod: 26

## 2025-04-19 PROCEDURE — 70450 CT HEAD/BRAIN W/O DYE: CPT | Mod: MC

## 2025-04-23 ENCOUNTER — EMERGENCY (EMERGENCY)
Facility: HOSPITAL | Age: 65
LOS: 1 days | End: 2025-04-23
Attending: STUDENT IN AN ORGANIZED HEALTH CARE EDUCATION/TRAINING PROGRAM
Payer: COMMERCIAL

## 2025-04-23 VITALS
SYSTOLIC BLOOD PRESSURE: 128 MMHG | HEART RATE: 82 BPM | DIASTOLIC BLOOD PRESSURE: 73 MMHG | RESPIRATION RATE: 16 BRPM | HEIGHT: 72 IN | TEMPERATURE: 97 F | WEIGHT: 198.42 LBS | OXYGEN SATURATION: 98 %

## 2025-04-23 DIAGNOSIS — Z98.890 OTHER SPECIFIED POSTPROCEDURAL STATES: Chronic | ICD-10-CM

## 2025-04-23 PROCEDURE — 82962 GLUCOSE BLOOD TEST: CPT

## 2025-04-23 PROCEDURE — 70450 CT HEAD/BRAIN W/O DYE: CPT | Mod: MC

## 2025-04-23 PROCEDURE — 72125 CT NECK SPINE W/O DYE: CPT | Mod: 26

## 2025-04-23 PROCEDURE — 70450 CT HEAD/BRAIN W/O DYE: CPT | Mod: 26

## 2025-04-23 PROCEDURE — 72125 CT NECK SPINE W/O DYE: CPT | Mod: MC

## 2025-04-23 PROCEDURE — 99285 EMERGENCY DEPT VISIT HI MDM: CPT | Mod: 25

## 2025-04-23 PROCEDURE — 99284 EMERGENCY DEPT VISIT MOD MDM: CPT

## 2025-04-23 NOTE — ED ADULT TRIAGE NOTE - CHIEF COMPLAINT QUOTE
pt biba [place], pt has no complaints. changed into yellow gown belongings secured

## 2025-04-23 NOTE — ED ADULT NURSE NOTE - OBJECTIVE STATEMENT
Pt [place] from [place] station s/p fall and intox.  Pt noted to have abrasion on top of forehead right side.  When asked why he's here, he states "you know why I'm here".  pt has full ROM.

## 2025-04-24 VITALS
HEART RATE: 69 BPM | RESPIRATION RATE: 18 BRPM | TEMPERATURE: 99 F | DIASTOLIC BLOOD PRESSURE: 84 MMHG | SYSTOLIC BLOOD PRESSURE: 125 MMHG | OXYGEN SATURATION: 95 %

## 2025-04-24 NOTE — ED PROVIDER NOTE - NSFOLLOWUPINSTRUCTIONS_ED_ALL_ED_FT
Follow up with spine doctor regarding C7 transverse process fracture seen on CT scan.    Refrain from excess alcohol consumption.    Return to the emergency room for any new or worsening issues.

## 2025-04-24 NOTE — ED PROVIDER NOTE - PATIENT PORTAL LINK FT
You can access the FollowMyHealth Patient Portal offered by [facility] by registering at the following website: [email or web address]. By joining [employer]’s FollowMyHealth portal, you will also be able to view your health information using other applications (apps) compatible with our system.

## 2025-04-24 NOTE — ED PROVIDER NOTE - OBJECTIVE STATEMENT
Pt is a 64 yo M brought in by EMS after being found intoxicated. Pt admits to drinking alcohol today and admits to having a fall and hitting his head.  Pt with no other complaints.

## 2025-04-24 NOTE — ED PROVIDER NOTE - PHYSICAL EXAMINATION
Constitutional - well-developed.   Head - small abrasion to R forehead. Airway patent.   Eyes - PERRL.   CV - RRR. no murmur. no edema.   Pulm - CTAB.   Abd - soft, nt. no rebound. no guarding.   Neuro - A&Ox3. strength 5/5 x4. sensation intact x4.   Skin - No rash. .  MSK - normal ROM. no c-spine ttp

## 2025-04-24 NOTE — ED PROVIDER NOTE - CLINICAL SUMMARY MEDICAL DECISION MAKING FREE TEXT BOX
Pt here for a fall from alcohol abuse. Pt found to have isolated TP fracture on CT.  no indication for further imaging.  Pt signed out to dr. villarreal pending sobriety and when clinically sober can dc.

## 2025-04-24 NOTE — ED ADULT NURSE REASSESSMENT NOTE - NS ED NURSE REASSESS COMMENT FT1
Pt awakened, trial ambulation.  Walking with steady gait.  Ready for discharge.  MD made aware to provide DC orders/papers.

## 2025-04-24 NOTE — ED PROVIDER NOTE - CARE PLAN
Principal Discharge DX:	Alcohol intoxication  Secondary Diagnosis:	Cervical transverse process fracture   1

## 2025-04-24 NOTE — ED PROVIDER NOTE - PROGRESS NOTE DETAILS
Tj: Pt received in signout from Dr. Acosta. Pt now AAOx4, ambulatory with steady gait, clinically sober. Pt made aware of C7 transverse process fracture; will give referral to spine as outpatient. Stable for discharge.

## 2025-04-24 NOTE — ED PROVIDER NOTE - CARE PROVIDER_API CALL
Israel Nguyen  Neurosurgery  [address], [address]  [place] [address]  Phone: [phone]  Fax: [phone]  Follow Up Time:

## 2025-06-20 ENCOUNTER — EMERGENCY (EMERGENCY)
Facility: HOSPITAL | Age: 65
LOS: 1 days | End: 2025-06-20
Attending: EMERGENCY MEDICINE
Payer: COMMERCIAL

## 2025-06-20 VITALS
WEIGHT: 184.97 LBS | TEMPERATURE: 98 F | SYSTOLIC BLOOD PRESSURE: 111 MMHG | DIASTOLIC BLOOD PRESSURE: 68 MMHG | HEART RATE: 72 BPM | HEIGHT: 72 IN | RESPIRATION RATE: 20 BRPM | OXYGEN SATURATION: 97 %

## 2025-06-20 DIAGNOSIS — Z98.890 OTHER SPECIFIED POSTPROCEDURAL STATES: Chronic | ICD-10-CM

## 2025-06-20 PROCEDURE — 99285 EMERGENCY DEPT VISIT HI MDM: CPT

## 2025-06-20 PROCEDURE — 99283 EMERGENCY DEPT VISIT LOW MDM: CPT

## 2025-06-20 PROCEDURE — 36415 COLL VENOUS BLD VENIPUNCTURE: CPT

## 2025-06-20 PROCEDURE — 80307 DRUG TEST PRSMV CHEM ANLYZR: CPT

## 2025-06-20 NOTE — ED PROVIDER NOTE - NEUROLOGICAL, MLM
Alert and oriented, no focal deficits, no motor or sensory deficits. increased reaction time, ataxic gait

## 2025-06-20 NOTE — ED ADULT TRIAGE NOTE - CHIEF COMPLAINT QUOTE
PT reports to ED BIBA with complaints of intox, pt pleasant in triage as per ems he was on the bus when he was kicked off for being intox. pt reports drinking a bottle of vodka today.

## 2025-06-20 NOTE — ED PROVIDER NOTE - CLINICAL SUMMARY MEDICAL DECISION MAKING FREE TEXT BOX
pt is acutely Intoxicated and will check alcohol level and wait for sobriety provide food and routine vitals in the meantime.

## 2025-06-20 NOTE — ED PROVIDER NOTE - PATIENT PORTAL LINK FT
You can access the FollowMyHealth Patient Portal offered by [facility] by registering at the following website: [email or web address]. By joining [employer]’s FollowMyHealth portal, you will also be able to view your health information using other applications (apps) compatible with our system.

## 2025-06-20 NOTE — ED PROVIDER NOTE - OBJECTIVE STATEMENT
65-year-old male with no known medical problem presents with alcohol ingestion.  Patient was going by bus and was brought in by EMS due to excessive alcohol use.  Patient endorses use of vodka today.  Patient has no complaints and wants to go home.

## 2025-06-20 NOTE — ED PROVIDER NOTE - NSFOLLOWUPINSTRUCTIONS_ED_ALL_ED_FT
don't drink alcohol  Follow healthy lifestyle  Stay hydrated  Return promptly in case of worsening symptoms

## 2025-06-21 VITALS
DIASTOLIC BLOOD PRESSURE: 100 MMHG | HEART RATE: 83 BPM | OXYGEN SATURATION: 98 % | SYSTOLIC BLOOD PRESSURE: 149 MMHG | TEMPERATURE: 98 F | RESPIRATION RATE: 20 BRPM

## 2025-06-21 LAB — ETHANOL SERPL-MCNC: 352 MG/DL — HIGH (ref 0–9)

## 2025-06-21 NOTE — ED ADULT NURSE NOTE - NSFALLRISKINTERV_ED_ALL_ED
Assistance OOB with selected safe patient handling equipment if applicable/Communicate fall risk and risk factors to all staff, patient, and family/Provide patient with walking aids/Provide visual cue: yellow wristband, yellow gown, etc/Reinforce activity limits and safety measures with patient and family/Call bell, personal items and telephone in reach/Instruct patient to call for assistance before getting out of bed/chair/stretcher/Non-slip footwear applied when patient is off stretcher/[place] to call system/Physically safe environment - no spills, clutter or unnecessary equipment/Purposeful Proactive Rounding/Room/bathroom lighting operational, light cord in reach

## 2025-06-21 NOTE — ED ADULT NURSE NOTE - OBJECTIVE STATEMENT
Unable to assess pt due to intox,. Pt is responsive via tactile stimulation and verbal. Pt is on RA unlabored and even RR. NAD

## 2025-07-08 ENCOUNTER — EMERGENCY (EMERGENCY)
Facility: HOSPITAL | Age: 65
LOS: 1 days | End: 2025-07-08
Attending: EMERGENCY MEDICINE
Payer: COMMERCIAL

## 2025-07-08 VITALS
RESPIRATION RATE: 18 BRPM | OXYGEN SATURATION: 97 % | HEIGHT: 72 IN | WEIGHT: 220.02 LBS | HEART RATE: 76 BPM | TEMPERATURE: 98 F | DIASTOLIC BLOOD PRESSURE: 93 MMHG | SYSTOLIC BLOOD PRESSURE: 157 MMHG

## 2025-07-08 DIAGNOSIS — Z98.890 OTHER SPECIFIED POSTPROCEDURAL STATES: Chronic | ICD-10-CM

## 2025-07-08 PROCEDURE — 72125 CT NECK SPINE W/O DYE: CPT

## 2025-07-08 PROCEDURE — 70450 CT HEAD/BRAIN W/O DYE: CPT | Mod: 26

## 2025-07-08 PROCEDURE — 99284 EMERGENCY DEPT VISIT MOD MDM: CPT

## 2025-07-08 PROCEDURE — 82962 GLUCOSE BLOOD TEST: CPT

## 2025-07-08 PROCEDURE — 72125 CT NECK SPINE W/O DYE: CPT | Mod: 26

## 2025-07-08 PROCEDURE — 70450 CT HEAD/BRAIN W/O DYE: CPT

## 2025-07-08 NOTE — ED PROVIDER NOTE - OBJECTIVE STATEMENT
65-year-old male past medical history of alcohol abuse comes to the ED admits to drinking alcohol and falling through a glass door.  Patient notes hitting his head with positive loss of consciousness.  Patient with superficial abrasions  of face.  And dried blood.  Patient denies any

## 2025-07-08 NOTE — ED PROVIDER NOTE - NSFOLLOWUPINSTRUCTIONS_ED_ALL_ED_FT
Alcohol Intoxication    Alcohol intoxication occurs when a person no longer thinks clearly or functions well (becomes impaired) after drinking alcohol. Intoxication can occur with just one drink. The legal definition of alcohol intoxication depends on the amount of alcohol in the blood (blood alcohol concentration, HAKEEM). HAKEEM of 80–100 mg/dL or higher is commonly considered legally intoxicated. The level of impairment depends on:    The amount of alcohol the person had.  The person's age, gender, and weight.  How often the person drinks.  Whether the person has other medical conditions, such as diabetes, seizures, or a heart condition.    Alcohol intoxication can range from mild to severe. The condition can be dangerous, especially if the person:    Also took certain drugs or prescription medicines.  Drinks a large amount of alcohol in a short period of time (binge drinks).    For women, binge drinking is having four or more drinks at one time.  For men, binge drinking is having five or more drinks at one time.    If you or anyone around you appears intoxicated, speak up and act.    What are the causes?  This condition is caused by drinking alcohol.    What increases the risk?  The following factors may make you more likely to develop this condition:    Peer pressure in young adults.  Difficulty managing stress.  History of drug or alcohol abuse.  Combining alcohol with drugs.  Family history of drug or alcohol abuse.  Low body weight.  Binge drinking.    What are the signs or symptoms?  Symptoms of alcohol intoxication can vary from person to person. Symptoms can be mild, moderate, or severe.    Symptoms of mild alcohol intoxication may include:    Feeling relaxed or sleepy.  Having mild difficulty with coordination, speech, memory, or attention.    Symptoms of moderate alcohol intoxication may include:    Extreme emotions, like anger or sadness.  Moderate difficulty with coordination, speech, memory, or attention.    Symptoms of severe alcohol intoxication may include:    Severe difficulty with coordination, speech, memory, or attention.  Passing out.  Vomiting.  Confusion.  Slow breathing.  Coma.    Intoxication can change quickly from mild to severe. It can cause coma or death, especially in people who are not exposed to alcohol often.    How is this diagnosed?  Your health care provider will ask you how much alcohol you drank and what kind you had. Intoxication may also be diagnosed based on:    Your symptoms and medical history.  A physical exam.  A blood test that measures HAKEEM.  A smell of alcohol on your breath.    How is this treated?  Treatment for alcohol intoxication may include:    Being monitored in an emergency department, hospital, or treatment center until your HAKEEM comes down and it is safe for you to go home.  IV fluids to prevent or treat loss of fluid in the body (dehydration).  Medicine to treat nausea or vomiting or to get rid of alcohol in the body.  Counseling (brief intervention) about the dangers of using alcohol.  Treatment for substance use disorder.  Oxygen therapy or a breathing machine (ventilator).    Long-term (chronic) exposure to alcohol can have long-term effects on your brain, heart, and gastrointestinal system. These effects can be serious and may also require treatment.    Follow these instructions at home:         Eating and drinking     Do not drink alcohol if:    Your health care provider tells you not to drink.  You are pregnant, may be pregnant, or are planning to become pregnant.  You are under the legal drinking age (21 years old in the [place]  You are taking medicines that should not be taken with alcohol.  You have a medical condition, and alcohol makes it worse.  You need to drive or perform activities that require you to be alert.  You have substance use disorder.  Ask your health care provider if alcohol is safe for you. If your health care provider allows you to drink alcohol, limit how much you have. You may drink:    0–1 drink a day for women.   0–2 drinks a day for men.     Be aware of how much alcohol is in your drink. In the [place] one drink equals one 12 oz bottle of beer (355 mL), one 5 oz glass of wine (148 mL), or one 1½ oz shot of hard liquor (44 mL).  Avoid drinking alcohol on an empty stomach.  Stay hydrated. Drink enough fluid to keep your urine pale yellow. Avoid caffeine because it can dehydrate you.  Avoid drinking more than one drink per hour.  When having multiple drinks, drink water or a non-alcoholic beverage between alcoholic drinks.        General instructions    Take over-the-counter and prescription medicines only as told by your health care provider.  Do not drive after drinking any amount of alcohol. Plan for a designated  or another way to go home.  Have someone responsible stay with you while you are intoxicated. You should not be left alone.  Keep all follow-up visits as told by your health care provider. This is important.    Contact a health care provider if:  You do not feel better after a few days.  You have problems at work, at school, or at home due to drinking.    Get help right away if:  You have any of the following:    Moderate to severe trouble with coordination, speech, memory, or attention.  Trouble staying awake.  Severe confusion.  A seizure.  Light-headedness.  Fainting.  Vomiting bright red blood or material that looks like coffee grounds.  Bloody stool (feces). The blood may make your stool bright red, black, or tarry. It may also smell bad.  Shakiness when trying to stop drinking.  Thoughts about hurting yourself or others.    If you ever feel like you may hurt yourself or others, or have thoughts about taking your own life, get help right away. You can go to your nearest emergency department or call:    Your local emergency services (911 in the [place]  A suicide crisis helpline, such as the National Suicide Prevention Lifeline at 1-866.469.6699. This is open 24 hours a day.    Summary  Alcohol intoxication occurs when a person no longer thinks clearly or functions well after drinking alcohol.  If your health care provider says that alcohol is safe for you, limit alcohol intake to no more than 1 drink a day for women (no drinks if you are pregnant) and 2 drinks a day for men. One drink equals 12 oz of beer, 5 oz of wine, or 1½ oz of hard liquor.  Contact your health care provider if drinking has caused you problems at work, school, or home.  Get help right away if you have thoughts about hurting yourself or others.    ADDITIONAL NOTES AND INSTRUCTIONS    Please follow up with your Primary MD in 24-48 hr.  Seek immediate medical care for any new/worsening signs or symptoms.       Laceration Care, Adult    A laceration is a cut that may go through all layers of the skin and into the tissue that is right under the skin. Some lacerations heal on their own. Others need to be closed with stitches (sutures), staples, skin adhesive strips, or skin glue. Proper care of a laceration reduces the risk for infection, helps the laceration heal better, and may prevent scarring.    How to care for your laceration  Wash your hands with soap and water before touching your wound or changing your bandage (dressing). If soap and water are not available, use hand .    Keep the wound clean and dry.    If you were given a dressing, you should change it at least once a day, or as told by your health care provider. You should also change it if it becomes wet or dirty.        If sutures or staples were used:    Keep the wound completely dry for the first 24 hours, or as told by your health care provider. After that time, you may shower or bathe. However, make sure that the wound is not soaked in water until after the sutures or staples have been removed.  Clean the wound once each day, or as told by your health care provider:    Wash the wound with soap and water.  Rinse the wound with water to remove all soap.  Pat the wound dry with a clean towel. Do not rub the wound.  After cleaning the wound, apply a thin layer of antibiotic ointment as told by your health care provider. This will help prevent infection and keep the dressing from sticking to the wound.  Have the sutures or staples removed as told by your health care provider.        If skin adhesive strips were used:    Do not get the skin adhesive strips wet. You may shower or bathe, but be careful to keep the wound dry.  If the wound gets wet, pat it dry with a clean towel. Do not rub the wound.  Skin adhesive strips fall off on their own. You may trim the strips as the wound heals. Do not remove skin adhesive strips that are still stuck to the wound. They will fall off in time.        If skin glue was used:    Try to keep the wound dry, but you may briefly wet it in the shower or bath. Do not soak the wound in water, such as by swimming.  After you have showered or bathed, gently pat the wound dry with a clean towel. Do not rub the wound.  Do not do any activities that will make you sweat heavily until the skin glue has fallen off on its own.  Do not apply liquid, cream, or ointment medicine to the wound while the skin glue is in place. Using those may loosen the film before the wound has healed.  If a dressing is placed over the wound, be careful not to apply tape directly over the skin glue. Doing that may cause the glue to be pulled off before the wound has healed.  Do not pick at the glue. Skin glue usually remains in place for 5–10 days and then falls off the skin.        General instructions     Take over-the-counter and prescription medicines only as told by your health care provider.  If you were prescribed an antibiotic medicine or ointment, take or apply it as told by your health care provider. Do not stop using it even if your condition improves.  Do not scratch or pick at the wound.  Check your wound every day for signs of infection. Watch for:    Redness, swelling, or pain.  Fluid, blood, or pus.  Raise (elevate) the injured area above the level of your heart while you are sitting or lying down for the first 24–48 hours after the laceration is repaired.  If directed, put ice on the affected area:    Put ice in a plastic bag.  Place a towel between your skin and the bag.  Leave the ice on for 20 minutes, 2–3 times a day.  Keep all follow-up visits as told by your health care provider. This is important.    Contact a health care provider if:  You received a tetanus shot and you have swelling, severe pain, redness, or bleeding at the injection site.  You have a fever.  A wound that was closed breaks open.  You notice a bad smell coming from your wound or your dressing.  You notice something coming out of the wound, such as wood or glass.  Your pain is not controlled with medicine.  You have increased redness, swelling, or pain at the site of your wound.  You have fluid, blood, or pus coming from your wound.  You need to change the dressing often due to fluid, blood, or pus that is draining from the wound.  You develop a new rash.  You develop numbness around the wound.    Get help right away if:  You develop severe swelling around the wound.  Your pain suddenly increases and is severe.  You develop painful lumps near the wound or on skin anywhere else on your body.  You have a red streak going away from your wound.  The wound is on your hand or foot and you cannot properly move a finger or toe.  The wound is on your hand or foot, and you notice that your fingers or toes look pale or bluish.    Summary  A laceration is a cut that may go through all layers of the skin and into the tissue that is right under the skin.  Some lacerations heal on their own. Others need to be closed with stitches (sutures), staples, skin adhesive strips, or skin glue.  Proper care of a laceration reduces the risk of infection, helps the laceration heal better, and prevents scarring.    ADDITIONAL NOTES AND INSTRUCTIONS    Please follow up with your Primary MD in 24-48 hr.  Seek immediate medical care for any new/worsening signs or symptoms.

## 2025-07-08 NOTE — ED PROVIDER NOTE - CARE PLAN
1 Principal Discharge DX:	Alcohol intoxication, uncomplicated  Secondary Diagnosis:	Facial laceration, initial encounter

## 2025-07-08 NOTE — ED PROVIDER NOTE - CONDITION AT DISCHARGE:
Render Risk Assessment In Note?: no Detail Level: Simple Additional Notes: Contacted pharmacy to see if Bensal HP ointment is available. Per pharmacist, they tried ordering the medication in 2020 with no luck, but will contact the clinic once they have a definite answer. Relayed information to patient. Satisfactory

## 2025-07-08 NOTE — ED ADULT TRIAGE NOTE - CHIEF COMPLAINT QUOTE
Pt BIBEMS c/o fall through glass door. +head strike and LOC. Lacerations noted to face. Denies blood thinners. Pt states he drank 1 pint of vodka today. Changed into yellow gown. MD Reynolds at bedside for evaluation. Priority CT called.

## 2025-07-08 NOTE — ED PROVIDER NOTE - PATIENT PORTAL LINK FT
You can access the FollowMyHealth Patient Portal offered by [facility] by registering at the following website: [email or web address]. By joining [employer]’s FollowMyHealth portal, you will also be able to view your health information using other applications (apps) compatible with our system.

## 2025-07-08 NOTE — ED PROVIDER NOTE - CLINICAL SUMMARY MEDICAL DECISION MAKING FREE TEXT BOX
status post fall hitting head will evaluate for trauma labs CT of the head and neck and reassess status post fall hitting head will evaluate for trauma labs CT of the head and neck and reassess    Patient received in sign-out pending sobriety.

## 2025-07-09 VITALS
RESPIRATION RATE: 18 BRPM | HEART RATE: 90 BPM | SYSTOLIC BLOOD PRESSURE: 140 MMHG | DIASTOLIC BLOOD PRESSURE: 82 MMHG | OXYGEN SATURATION: 97 % | TEMPERATURE: 98 F

## 2025-07-09 PROCEDURE — 72125 CT NECK SPINE W/O DYE: CPT

## 2025-07-09 PROCEDURE — 90715 TDAP VACCINE 7 YRS/> IM: CPT

## 2025-07-09 PROCEDURE — 90471 IMMUNIZATION ADMIN: CPT

## 2025-07-09 PROCEDURE — 82962 GLUCOSE BLOOD TEST: CPT

## 2025-07-09 PROCEDURE — 70450 CT HEAD/BRAIN W/O DYE: CPT

## 2025-07-09 PROCEDURE — 99285 EMERGENCY DEPT VISIT HI MDM: CPT | Mod: 25

## 2025-07-09 RX ORDER — CHLORDIAZEPOXIDE HCL 10 MG
50 CAPSULE ORAL ONCE
Refills: 0 | Status: DISCONTINUED | OUTPATIENT
Start: 2025-07-09 | End: 2025-07-09

## 2025-07-09 RX ORDER — CHLORDIAZEPOXIDE HCL 10 MG
50 CAPSULE ORAL ONCE
Refills: 0 | Status: COMPLETED | OUTPATIENT
Start: 2025-07-09 | End: 2025-07-09

## 2025-07-09 RX ADMIN — Medication 50 MILLIGRAM(S): at 06:08

## 2025-07-09 NOTE — ED ADULT NURSE NOTE - NSFALLHARMRISKINTERV_ED_ALL_ED
Assistance OOB with selected safe patient handling equipment if applicable/Assistance with ambulation/Communicate risk of Fall with Harm to all staff, patient, and family/Monitor gait and stability/Monitor for mental status changes and reorient to person, place, and time, as needed/Provide visual cue: red socks, yellow wristband, yellow gown, etc/Reinforce activity limits and safety measures with patient and family/Toileting schedule using arm’s reach rule for commode and bathroom/Use of alarms - bed, stretcher, chair and/or video monitoring/Bed in lowest position, wheels locked, appropriate side rails in place/Call bell, personal items and telephone in reach/Instruct patient to call for assistance before getting out of bed/chair/stretcher/Non-slip footwear applied when patient is off stretcher/[place] to call system/Physically safe environment - no spills, clutter or unnecessary equipment/Purposeful Proactive Rounding/Room/bathroom lighting operational, light cord in reach

## 2025-07-09 NOTE — ED ADULT NURSE NOTE - OBJECTIVE STATEMENT
Patient AAOx4  presents to ED s/p fall.  +Headstrike +LOC + blood thinner. Patient states he had a few drinks today and fe;l through a glass door. Lacerations noted  to face, bleeding controlled. No complaints noted.   Respirations unlabored on RA, NAD.

## 2025-07-11 ENCOUNTER — EMERGENCY (EMERGENCY)
Facility: HOSPITAL | Age: 65
LOS: 1 days | End: 2025-07-11
Attending: STUDENT IN AN ORGANIZED HEALTH CARE EDUCATION/TRAINING PROGRAM
Payer: COMMERCIAL

## 2025-07-11 VITALS
HEART RATE: 78 BPM | DIASTOLIC BLOOD PRESSURE: 81 MMHG | SYSTOLIC BLOOD PRESSURE: 129 MMHG | OXYGEN SATURATION: 95 % | TEMPERATURE: 98 F | RESPIRATION RATE: 18 BRPM

## 2025-07-11 VITALS
HEART RATE: 60 BPM | SYSTOLIC BLOOD PRESSURE: 132 MMHG | DIASTOLIC BLOOD PRESSURE: 74 MMHG | WEIGHT: 250 LBS | TEMPERATURE: 98 F | HEIGHT: 72 IN | OXYGEN SATURATION: 99 % | RESPIRATION RATE: 16 BRPM

## 2025-07-11 DIAGNOSIS — Z98.890 OTHER SPECIFIED POSTPROCEDURAL STATES: Chronic | ICD-10-CM

## 2025-07-11 PROCEDURE — 99283 EMERGENCY DEPT VISIT LOW MDM: CPT

## 2025-07-11 PROCEDURE — 99285 EMERGENCY DEPT VISIT HI MDM: CPT

## 2025-07-11 NOTE — ED PROVIDER NOTE - CLINICAL SUMMARY MEDICAL DECISION MAKING FREE TEXT BOX
64 yo male history of alcohol use presenting to the ED by EMS intoxicated. No wounds or injury present on physical exam. He drank 1.5 pints of vodka today and is sobering up in the ED.

## 2025-07-11 NOTE — ED ADULT TRIAGE NOTE - CHIEF COMPLAINT QUOTE
Patient BIBEMS after being found on a bus intoxicated. Hx of alcohol abuse. Patient changed into yellow gown with belongings secured.

## 2025-07-11 NOTE — ED PROVIDER NOTE - PATIENT PORTAL LINK FT
You can access the FollowMyHealth Patient Portal offered by [facility] by registering at the following website: [email or web address]. By joining [employer]’s FollowMyHealth portal, you will also be able to view your health information using other applications (apps) compatible with our system.

## 2025-07-11 NOTE — ED PROVIDER NOTE - ATTENDING CONTRIBUTION TO CARE
Pt with hx of alcohol abuse. Pt found sleeping on the bus and admits to drinking vodka today. no acute complaints.    physical- rrr. ctab. abd - soft, nt. no edema. no rash. steady gait. alert and oriented x3.    plan - Pt clinically sober. will d/c with return and f/up instructions.

## 2025-07-11 NOTE — ED PROVIDER NOTE - OBJECTIVE STATEMENT
Mr. Bray is a 64 yo male with pmh of HTN, and cerebral aneurysm on blood thinners presenting with intoxication. He was found on a bus intoxicated and brought to the ED. He reports drinking 1.5 pints of vodka today. He denies any injuries, chest pain, dyspnea, abdominal pain, or nausea and states he feels like he is sobering up. He denies ingestion of any other toxins.

## 2025-08-01 ENCOUNTER — EMERGENCY (EMERGENCY)
Facility: HOSPITAL | Age: 65
LOS: 1 days | End: 2025-08-01
Attending: EMERGENCY MEDICINE
Payer: COMMERCIAL

## 2025-08-01 VITALS — WEIGHT: 195.11 LBS | HEIGHT: 72 IN

## 2025-08-01 DIAGNOSIS — Z98.890 OTHER SPECIFIED POSTPROCEDURAL STATES: Chronic | ICD-10-CM

## 2025-08-01 PROCEDURE — 99283 EMERGENCY DEPT VISIT LOW MDM: CPT

## 2025-08-01 PROCEDURE — 99285 EMERGENCY DEPT VISIT HI MDM: CPT
